# Patient Record
Sex: FEMALE | Race: BLACK OR AFRICAN AMERICAN | NOT HISPANIC OR LATINO | URBAN - METROPOLITAN AREA
[De-identification: names, ages, dates, MRNs, and addresses within clinical notes are randomized per-mention and may not be internally consistent; named-entity substitution may affect disease eponyms.]

---

## 2018-08-13 ENCOUNTER — INPATIENT (INPATIENT)
Facility: HOSPITAL | Age: 53
LOS: 2 days | Discharge: PSYCHIATRIC OR SUBSTANCE USE FACILITY | End: 2018-08-16
Attending: HOSPITALIST | Admitting: HOSPITALIST
Payer: COMMERCIAL

## 2018-08-13 VITALS
RESPIRATION RATE: 18 BRPM | SYSTOLIC BLOOD PRESSURE: 127 MMHG | OXYGEN SATURATION: 98 % | HEART RATE: 80 BPM | TEMPERATURE: 98 F | DIASTOLIC BLOOD PRESSURE: 83 MMHG

## 2018-08-13 DIAGNOSIS — F13.230 SEDATIVE, HYPNOTIC OR ANXIOLYTIC DEPENDENCE WITH WITHDRAWAL, UNCOMPLICATED: ICD-10-CM

## 2018-08-13 DIAGNOSIS — R41.82 ALTERED MENTAL STATUS, UNSPECIFIED: ICD-10-CM

## 2018-08-13 DIAGNOSIS — F32.3 MAJOR DEPRESSIVE DISORDER, SINGLE EPISODE, SEVERE WITH PSYCHOTIC FEATURES: ICD-10-CM

## 2018-08-13 DIAGNOSIS — Z29.9 ENCOUNTER FOR PROPHYLACTIC MEASURES, UNSPECIFIED: ICD-10-CM

## 2018-08-13 DIAGNOSIS — F32.9 MAJOR DEPRESSIVE DISORDER, SINGLE EPISODE, UNSPECIFIED: ICD-10-CM

## 2018-08-13 DIAGNOSIS — F29 UNSPECIFIED PSYCHOSIS NOT DUE TO A SUBSTANCE OR KNOWN PHYSIOLOGICAL CONDITION: ICD-10-CM

## 2018-08-13 DIAGNOSIS — E87.6 HYPOKALEMIA: ICD-10-CM

## 2018-08-13 DIAGNOSIS — Z90.710 ACQUIRED ABSENCE OF BOTH CERVIX AND UTERUS: Chronic | ICD-10-CM

## 2018-08-13 LAB
ALBUMIN SERPL ELPH-MCNC: 4.6 G/DL — SIGNIFICANT CHANGE UP (ref 3.3–5)
ALP SERPL-CCNC: 50 U/L — SIGNIFICANT CHANGE UP (ref 40–120)
ALT FLD-CCNC: 20 U/L — SIGNIFICANT CHANGE UP (ref 4–33)
AMPHET UR-MCNC: NEGATIVE — SIGNIFICANT CHANGE UP
APAP SERPL-MCNC: < 15 UG/ML — LOW (ref 15–25)
APPEARANCE UR: CLEAR — SIGNIFICANT CHANGE UP
AST SERPL-CCNC: 28 U/L — SIGNIFICANT CHANGE UP (ref 4–32)
BARBITURATES UR SCN-MCNC: NEGATIVE — SIGNIFICANT CHANGE UP
BASE EXCESS BLDV CALC-SCNC: 3.1 MMOL/L — SIGNIFICANT CHANGE UP
BASOPHILS # BLD AUTO: 0.01 K/UL — SIGNIFICANT CHANGE UP (ref 0–0.2)
BASOPHILS NFR BLD AUTO: 0.2 % — SIGNIFICANT CHANGE UP (ref 0–2)
BENZODIAZ UR-MCNC: NEGATIVE — SIGNIFICANT CHANGE UP
BILIRUB SERPL-MCNC: 0.8 MG/DL — SIGNIFICANT CHANGE UP (ref 0.2–1.2)
BILIRUB UR-MCNC: NEGATIVE — SIGNIFICANT CHANGE UP
BLOOD GAS VENOUS - CREATININE: 0.7 MG/DL — SIGNIFICANT CHANGE UP (ref 0.5–1.3)
BLOOD UR QL VISUAL: NEGATIVE — SIGNIFICANT CHANGE UP
BUN SERPL-MCNC: 7 MG/DL — SIGNIFICANT CHANGE UP (ref 7–23)
CALCIUM SERPL-MCNC: 9.4 MG/DL — SIGNIFICANT CHANGE UP (ref 8.4–10.5)
CANNABINOIDS UR-MCNC: NEGATIVE — SIGNIFICANT CHANGE UP
CHLORIDE BLDV-SCNC: 102 MMOL/L — SIGNIFICANT CHANGE UP (ref 96–108)
CHLORIDE SERPL-SCNC: 96 MMOL/L — LOW (ref 98–107)
CO2 SERPL-SCNC: 18 MMOL/L — LOW (ref 22–31)
COCAINE METAB.OTHER UR-MCNC: NEGATIVE — SIGNIFICANT CHANGE UP
COLOR SPEC: YELLOW — SIGNIFICANT CHANGE UP
CREAT SERPL-MCNC: 0.72 MG/DL — SIGNIFICANT CHANGE UP (ref 0.5–1.3)
EOSINOPHIL # BLD AUTO: 0 K/UL — SIGNIFICANT CHANGE UP (ref 0–0.5)
EOSINOPHIL NFR BLD AUTO: 0 % — SIGNIFICANT CHANGE UP (ref 0–6)
ETHANOL BLD-MCNC: < 10 MG/DL — SIGNIFICANT CHANGE UP
GAS PNL BLDV: 134 MMOL/L — LOW (ref 136–146)
GLUCOSE BLDV-MCNC: 88 — SIGNIFICANT CHANGE UP (ref 70–99)
GLUCOSE SERPL-MCNC: 89 MG/DL — SIGNIFICANT CHANGE UP (ref 70–99)
GLUCOSE UR-MCNC: NEGATIVE — SIGNIFICANT CHANGE UP
HCO3 BLDV-SCNC: 25 MMOL/L — SIGNIFICANT CHANGE UP (ref 20–27)
HCT VFR BLD CALC: 40 % — SIGNIFICANT CHANGE UP (ref 34.5–45)
HCT VFR BLDV CALC: 41.8 % — SIGNIFICANT CHANGE UP (ref 34.5–45)
HGB BLD-MCNC: 13.7 G/DL — SIGNIFICANT CHANGE UP (ref 11.5–15.5)
HGB BLDV-MCNC: 13.6 G/DL — SIGNIFICANT CHANGE UP (ref 11.5–15.5)
IMM GRANULOCYTES # BLD AUTO: 0.03 # — SIGNIFICANT CHANGE UP
IMM GRANULOCYTES NFR BLD AUTO: 0.5 % — SIGNIFICANT CHANGE UP (ref 0–1.5)
KETONES UR-MCNC: SIGNIFICANT CHANGE UP
LACTATE BLDV-MCNC: 1.2 MMOL/L — SIGNIFICANT CHANGE UP (ref 0.5–2)
LEUKOCYTE ESTERASE UR-ACNC: HIGH
LYMPHOCYTES # BLD AUTO: 1.87 K/UL — SIGNIFICANT CHANGE UP (ref 1–3.3)
LYMPHOCYTES # BLD AUTO: 31 % — SIGNIFICANT CHANGE UP (ref 13–44)
MCHC RBC-ENTMCNC: 30.9 PG — SIGNIFICANT CHANGE UP (ref 27–34)
MCHC RBC-ENTMCNC: 34.3 % — SIGNIFICANT CHANGE UP (ref 32–36)
MCV RBC AUTO: 90.1 FL — SIGNIFICANT CHANGE UP (ref 80–100)
METHADONE UR-MCNC: NEGATIVE — SIGNIFICANT CHANGE UP
MONOCYTES # BLD AUTO: 0.35 K/UL — SIGNIFICANT CHANGE UP (ref 0–0.9)
MONOCYTES NFR BLD AUTO: 5.8 % — SIGNIFICANT CHANGE UP (ref 2–14)
MUCOUS THREADS # UR AUTO: SIGNIFICANT CHANGE UP
NEUTROPHILS # BLD AUTO: 3.77 K/UL — SIGNIFICANT CHANGE UP (ref 1.8–7.4)
NEUTROPHILS NFR BLD AUTO: 62.5 % — SIGNIFICANT CHANGE UP (ref 43–77)
NITRITE UR-MCNC: NEGATIVE — SIGNIFICANT CHANGE UP
NON-SQ EPI CELLS # UR AUTO: <1 — SIGNIFICANT CHANGE UP
NRBC # FLD: 0 — SIGNIFICANT CHANGE UP
OPIATES UR-MCNC: NEGATIVE — SIGNIFICANT CHANGE UP
OXYCODONE UR-MCNC: NEGATIVE — SIGNIFICANT CHANGE UP
PCO2 BLDV: 47 MMHG — SIGNIFICANT CHANGE UP (ref 41–51)
PCP UR-MCNC: NEGATIVE — SIGNIFICANT CHANGE UP
PH BLDV: 7.39 PH — SIGNIFICANT CHANGE UP (ref 7.32–7.43)
PH UR: 7 — SIGNIFICANT CHANGE UP (ref 4.6–8)
PLATELET # BLD AUTO: 214 K/UL — SIGNIFICANT CHANGE UP (ref 150–400)
PMV BLD: 10.1 FL — SIGNIFICANT CHANGE UP (ref 7–13)
PO2 BLDV: 19 MMHG — LOW (ref 35–40)
POTASSIUM BLDV-SCNC: 3.8 MMOL/L — SIGNIFICANT CHANGE UP (ref 3.4–4.5)
POTASSIUM SERPL-MCNC: 3.3 MMOL/L — LOW (ref 3.5–5.3)
POTASSIUM SERPL-SCNC: 3.3 MMOL/L — LOW (ref 3.5–5.3)
PROT SERPL-MCNC: 8 G/DL — SIGNIFICANT CHANGE UP (ref 6–8.3)
PROT UR-MCNC: 20 MG/DL — SIGNIFICANT CHANGE UP
RBC # BLD: 4.44 M/UL — SIGNIFICANT CHANGE UP (ref 3.8–5.2)
RBC # FLD: 11.7 % — SIGNIFICANT CHANGE UP (ref 10.3–14.5)
RBC CASTS # UR COMP ASSIST: SIGNIFICANT CHANGE UP (ref 0–?)
SALICYLATES SERPL-MCNC: < 5 MG/DL — LOW (ref 15–30)
SAO2 % BLDV: 22.3 % — LOW (ref 60–85)
SODIUM SERPL-SCNC: 135 MMOL/L — SIGNIFICANT CHANGE UP (ref 135–145)
SP GR SPEC: 1.01 — SIGNIFICANT CHANGE UP (ref 1–1.04)
SQUAMOUS # UR AUTO: SIGNIFICANT CHANGE UP
TSH SERPL-MCNC: 0.34 UIU/ML — SIGNIFICANT CHANGE UP (ref 0.27–4.2)
UROBILINOGEN FLD QL: NORMAL MG/DL — SIGNIFICANT CHANGE UP
WBC # BLD: 6.03 K/UL — SIGNIFICANT CHANGE UP (ref 3.8–10.5)
WBC # FLD AUTO: 6.03 K/UL — SIGNIFICANT CHANGE UP (ref 3.8–10.5)
WBC UR QL: HIGH (ref 0–?)

## 2018-08-13 PROCEDURE — 70450 CT HEAD/BRAIN W/O DYE: CPT | Mod: 26

## 2018-08-13 PROCEDURE — 90792 PSYCH DIAG EVAL W/MED SRVCS: CPT | Mod: GC

## 2018-08-13 PROCEDURE — 99223 1ST HOSP IP/OBS HIGH 75: CPT | Mod: GC

## 2018-08-13 RX ORDER — POTASSIUM CHLORIDE 20 MEQ
40 PACKET (EA) ORAL ONCE
Qty: 0 | Refills: 0 | Status: COMPLETED | OUTPATIENT
Start: 2018-08-13 | End: 2018-08-13

## 2018-08-13 RX ORDER — ALPRAZOLAM 0.25 MG
0.25 TABLET ORAL THREE TIMES A DAY
Qty: 0 | Refills: 0 | Status: DISCONTINUED | OUTPATIENT
Start: 2018-08-13 | End: 2018-08-14

## 2018-08-13 RX ADMIN — Medication 2 MILLIGRAM(S): at 15:52

## 2018-08-13 RX ADMIN — Medication 0.25 MILLIGRAM(S): at 22:44

## 2018-08-13 RX ADMIN — Medication 40 MILLIEQUIVALENT(S): at 16:16
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
## 2018-08-13 NOTE — ED BEHAVIORAL HEALTH ASSESSMENT NOTE - SUBSTANCE ISSUES AND PLAN (INCLUDE STANDING AND PRN MEDICATION)
initiate ativan taper 2 mg q6h, with 2 mg prn for CIWA scores >9 initiate ativan taper 2 mg q6h, with 2 mg prn for CIWA scores >9, can decrease dose by 0.5 mg every 24 hours if symptoms are controlled

## 2018-08-13 NOTE — ED PROVIDER NOTE - MEDICAL DECISION MAKING DETAILS
54 yo female with psych history and bizarre behavior with med non compliance. will obtain labs and psych consult.

## 2018-08-13 NOTE — H&P ADULT - NSHPREVIEWOFSYSTEMS_GEN_ALL_CORE
General: Denies dizziness, fatigue  Eyes: Denies blurry vision  ENMT: Denies rhinorrhea  Respiratory: Denies cough, SOB  Cardiovascular: Denies palpitations, CP  Gastrointestinal: Denies abd pain, N/V/D/C, hematochezia, melena. +decreased appetite.  Musculoskeletal: Denies edema, joint pain  Allergic/Immunologic: +facial hyperpigmentation  Psych: denies suicidal/homicidal ideations
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
## 2018-08-13 NOTE — H&P ADULT - ASSESSMENT
52 yo female with ?PMH of depression and anxiety with past psych admissions in [place], brought in by sister for strange behavior, admitted for possible BZD withdrawal vs acute psychosis.

## 2018-08-13 NOTE — ED BEHAVIORAL HEALTH ASSESSMENT NOTE - CONSEQUENCES
Pt reports a history of physical withdrawal symptoms (tremors), denies history of seizures/hospitalizations

## 2018-08-13 NOTE — ED PROVIDER NOTE - PROGRESS NOTE DETAILS
Jocelyn: Pt seen and examined by psych attending. pt now having auditory and olfactory hallucinations. pt also now tremulous and likely in benzo withdrawal will give BENZOS, OBTAIN CT HEAD AND MEDICALLY ADMIT. Jocelyn: will send [facility], hospitalist to follow. will admit patient.

## 2018-08-13 NOTE — ED ADULT NURSE NOTE - OBJECTIVE STATEMENT
Patient received in  c/o worsening depression, Per EMS, pt has been increasingly depressed and has been requesting her sister to take her to the hospital sister saids she is trying to ween pt off her medications. today pt jumped of the (shallow) window and was walking barefooted towards [facility] where she was picked up by EMS. In  pt appears depressed, verbalizing feeling of helplessness, insomnia, anhedonia and anorexia. Pt denies SI&HI, for AH pt stated "I sometimes hear music" pt denies CAH. Pt denies ETOH and substance use. safety and comfort measures maintained. will continue to monitor

## 2018-08-13 NOTE — ED PROVIDER NOTE - CARE PLAN
Principal Discharge DX:	Altered mental status, unspecified altered mental status type  Secondary Diagnosis:	Benzodiazepine withdrawal without complication  Secondary Diagnosis:	Hypokalemia

## 2018-08-13 NOTE — ED BEHAVIORAL HEALTH ASSESSMENT NOTE - PRIMARY DX
Psychosis, unspecified psychosis type Major depressive disorder with psychotic features Benzodiazepine withdrawal without complication
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
## 2018-08-13 NOTE — ED BEHAVIORAL HEALTH ASSESSMENT NOTE - RISK ASSESSMENT
Pt is at elevated imminent risk of self-harm given her active psychosis, depressive mood episode and substance use withdrawal. She is also suffering from psychosocial stressors with recent unemployment and current isolation. She reports a recent suicide attempt and is not adherent to any current antidepressants/antipsychotics. She has supportive family and no access to means and denies current SI/HI, but is actively disorganized and has recently lost 20 lbs, concerning for her ability to care for herself. Pt is at elevated imminent risk of self-harm given her active psychosis, depressive mood episode and substance use withdrawal. She is also suffering from psychosocial stressors with recent unemployment and current isolation. She reports a recent suicide attempt and is not adherent to any current antidepressants/antipsychotics. She has supportive family and no access to means and denies current SI/HI, but is actively disorganized and has recently lost 20 lbs, concerning for her ability to care for herself. She will be medically admitted at this time and will require re-evaluation by psychiatry for need to be admitted psychiatrically at a later time.
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
## 2018-08-13 NOTE — ED ADULT NURSE NOTE - NSIMPLEMENTINTERV_GEN_ALL_ED
Implemented All Universal Safety Interventions:  [place] to call system. Call bell, personal items and telephone within reach. Instruct patient to call for assistance. Room bathroom lighting operational. Non-slip footwear when patient is off stretcher. Physically safe environment: no spills, clutter or unnecessary equipment. Stretcher in lowest position, wheels locked, appropriate side rails in place.

## 2018-08-13 NOTE — H&P ADULT - NSHPLABSRESULTS_GEN_ALL_CORE
13.7   6.03  )-----------( 214      ( 13 Aug 2018 12:45 )             40.0       135  |  96<L>  |  7   ----------------------------<  89  3.3<L>   |  18<L>  |  0.72    Ca    9.4      13 Aug 2018 12:41    TPro  8.0  /  Alb  4.6  /  TBili  0.8  /  DBili  x   /  AST  28  /  ALT  20  /  AlkPhos  50  08-13    Urinalysis Basic - ( 13 Aug 2018 12:51 )  Color: YELLOW / Appearance: CLEAR / S.014 / pH: 7.0  Gluc: NEGATIVE / Ketone: LARGE  / Bili: NEGATIVE / Urobili: NORMAL mg/dL   Blood: NEGATIVE / Protein: 20 mg/dL / Nitrite: NEGATIVE   Leuk Esterase: MODERATE / RBC: 0-2 / WBC 5-10   Sq Epi: OCC / Non Sq Epi: x / Bacteria: x    < from: CT Head No Cont (18 @ 15:50) >    Unremarkable noncontrast head CT.    < end of copied text >    Toxicology Screen, Drugs of Abuse, Urine (18 @ 12:51)    Phencyclidine Level, Urine: NEGATIVE    Amphetamine, Urine: NEGATIVE    Barbiturates Screen, Urine: NEGATIVE    Benzodiazepine, Urine: NEGATIVE    Cannabinoids, Urine: NEGATIVE    Cocaine Metabolite, Urine: NEGATIVE    Methadone, Urine: NEGATIVE    Opiate, Urine: NEGATIVE    Oxycodone, Urine: NEGATIVE:

## 2018-08-13 NOTE — H&P ADULT - NSHPSOCIALHISTORY_GEN_ALL_CORE
Lives alone in [place]. Has daughter Christy who she is in contact with. Currently staying with sister in [place]. Denies smoking, drinking, recreational drugs.

## 2018-08-13 NOTE — H&P ADULT - NSHPPHYSICALEXAM_GEN_ALL_CORE
PHYSICAL EXAM:  GENERAL: No acute distress  HEAD:  Atraumatic, Normocephalic  EYES: EOMI, PERRLA, conjunctiva and sclera clear  NECK: Supple, no lymphadenopathy  CHEST/LUNG: CTAB; No wheezes, rales, or rhonchi  HEART: Regular rate and rhythm; No murmurs, rubs, or gallops  ABDOMEN: Soft, non-tender, non-distended; normal bowel sounds  EXTREMITIES:  2+ peripheral pulses b/l, No clubbing, cyanosis, or edema  NEUROLOGY: A&O x 4. Tremulous when arms outstretched.  SKIN: Areas of scar hyperpigmentation on face  PSYCH: Normal mood and affect, some pressured speech, tangential thinking

## 2018-08-13 NOTE — ED BEHAVIORAL HEALTH ASSESSMENT NOTE - OTHER PAST PSYCHIATRIC HISTORY (INCLUDE DETAILS REGARDING ONSET, COURSE OF ILLNESS, INPATIENT/OUTPATIENT TREATMENT)
One hospitalization after gabapentin overdose (may have been medical), she reports this is her only suicide attempt. Never pursued outpatient psychiatric treatment, she receives her medications through her PCP.

## 2018-08-13 NOTE — ED BEHAVIORAL HEALTH ASSESSMENT NOTE - OTHER
sister called 932 bilateral low amplitude resting tremor, pt frequently shifting restlessly in bed, at times lying across the bed horizontally without support bilateral low amplitude resting tremor, tongue fasciculations present, pt frequently shifting restlessly in bed, at times lying across the bed horizontally without support

## 2018-08-13 NOTE — ED BEHAVIORAL HEALTH ASSESSMENT NOTE - DESCRIPTION
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 Vital Signs Last 24 Hrs  T(C): 36.8 (13 Aug 2018 11:42), Max: 36.8 (13 Aug 2018 11:42)  T(F): 98.3 (13 Aug 2018 11:42), Max: 98.3 (13 Aug 2018 11:42)  HR: 80 (13 Aug 2018 11:42) (80 - 80)  BP: 127/83 (13 Aug 2018 11:42) (127/83 - 127/83)  RR: 18 (13 Aug 2018 11:42) (18 - 18)  SpO2: 98% (13 Aug 2018 11:42) (98% - 98%)                        13.7   6.03  )-----------( 214      ( 13 Aug 2018 12:45 )             40.0     Utox negative ICU Vital Signs Last 24 Hrs  T(C): 36.8 (13 Aug 2018 11:42), Max: 36.8 (13 Aug 2018 11:42)  T(F): 98.3 (13 Aug 2018 11:42), Max: 98.3 (13 Aug 2018 11:42)  HR: 80 (13 Aug 2018 11:42) (80 - 80)  BP: 127/83 (13 Aug 2018 11:42) (127/83 - 127/83)  RR: 18 (13 Aug 2018 11:42) (18 - 18)  SpO2: 98% (13 Aug 2018 11:42) (98% - 98%)                        13.7   6.03  )-----------( 214      ( 13 Aug 2018 12:45 )             40.0   08-13    135  |  96<L>  |  7   ----------------------------<  89  3.3<L>   |  18<L>  |  0.72    Ca    9.4      13 Aug 2018 12:41    TPro  8.0  /  Alb  4.6  /  TBili  0.8  /  DBili  x   /  AST  28  /  ALT  20  /  AlkPhos  50  08-13    Utox negative

## 2018-08-13 NOTE — H&P ADULT - ATTENDING COMMENTS
this unfortunate woman with an unclear psych history presents after abruptly stopping her xanax 3 days ago.  she had mild tremors and tachycardia which may be manifestations of benzo withdrawal.  we will admit her to medicine and treat with a slow benzo taper.

## 2018-08-13 NOTE — H&P ADULT - HISTORY OF PRESENT ILLNESS
52 yo female with unclear psychiatric history BIBEMS for bizarre behavior. Pt lives in [place] and has been visiting her sister. EMS reports that the sister says she hasn't been taking her psych meds and since then been acting strange. Today pt tried to climb out the window prompting her sister to call 911. Pt says that she only takes xanax. Pt denies suicidal/homicidal ideations. Denies audio and visual hallucinations. Pt is a poor historian. 52 yo female with unclear psychiatric history BIBEMS for bizarre behavior. Pt lives in [place] and has been visiting her sister who lives in [place]. As per chart review, EMS reports that the sister says she hasn't been taking her psych meds and since then has been acting strange. Today pt tried to climb out the window prompting her sister to call 911. According to patient, she climbed out of the window to get some fresh air and because her sister wouldn't take her to the doctor. Pt reports taking xanax 1mg TID and gabapentin. She stopped taking gabapentin 2 weeks ago because it was "eating her insides" and "destroying her body". She stopped Xanax on Friday because she forgot the medications at home when she came to [place] to visit her sister. Confirmed this with her daughter Christy who reports the pill bottle is at home. Pt denies suicidal/homicidal ideations. Denies audio and visual hallucinations. Pt is a poor historian. Reports being depressed since losing her job several months ago. Currently having some tremors in arms that she does not usually have. Denies any seizures at home. Reports having decreased appetite and 30 lb weight loss over last several months.

## 2018-08-13 NOTE — ED BEHAVIORAL HEALTH ASSESSMENT NOTE - DETAILS
low back pain see HPI, recent gabapentin OD in [place] daughter informed of admission to hospital information added to NELLIE garcia

## 2018-08-14 LAB
BUN SERPL-MCNC: 8 MG/DL — SIGNIFICANT CHANGE UP (ref 7–23)
CALCIUM SERPL-MCNC: 9 MG/DL — SIGNIFICANT CHANGE UP (ref 8.4–10.5)
CHLORIDE SERPL-SCNC: 99 MMOL/L — SIGNIFICANT CHANGE UP (ref 98–107)
CO2 SERPL-SCNC: 24 MMOL/L — SIGNIFICANT CHANGE UP (ref 22–31)
CREAT SERPL-MCNC: 0.76 MG/DL — SIGNIFICANT CHANGE UP (ref 0.5–1.3)
GLUCOSE SERPL-MCNC: 88 MG/DL — SIGNIFICANT CHANGE UP (ref 70–99)
HCT VFR BLD CALC: 34.7 % — SIGNIFICANT CHANGE UP (ref 34.5–45)
HGB BLD-MCNC: 11.7 G/DL — SIGNIFICANT CHANGE UP (ref 11.5–15.5)
MAGNESIUM SERPL-MCNC: 2.1 MG/DL — SIGNIFICANT CHANGE UP (ref 1.6–2.6)
MCHC RBC-ENTMCNC: 29.5 PG — SIGNIFICANT CHANGE UP (ref 27–34)
MCHC RBC-ENTMCNC: 33.7 % — SIGNIFICANT CHANGE UP (ref 32–36)
MCV RBC AUTO: 87.6 FL — SIGNIFICANT CHANGE UP (ref 80–100)
NRBC # FLD: 0 — SIGNIFICANT CHANGE UP
PHOSPHATE SERPL-MCNC: 4 MG/DL — SIGNIFICANT CHANGE UP (ref 2.5–4.5)
PLATELET # BLD AUTO: 178 K/UL — SIGNIFICANT CHANGE UP (ref 150–400)
PMV BLD: 10 FL — SIGNIFICANT CHANGE UP (ref 7–13)
POTASSIUM SERPL-MCNC: 4 MMOL/L — SIGNIFICANT CHANGE UP (ref 3.5–5.3)
POTASSIUM SERPL-SCNC: 4 MMOL/L — SIGNIFICANT CHANGE UP (ref 3.5–5.3)
RBC # BLD: 3.96 M/UL — SIGNIFICANT CHANGE UP (ref 3.8–5.2)
RBC # FLD: 11.9 % — SIGNIFICANT CHANGE UP (ref 10.3–14.5)
SODIUM SERPL-SCNC: 134 MMOL/L — LOW (ref 135–145)
WBC # BLD: 5.9 K/UL — SIGNIFICANT CHANGE UP (ref 3.8–10.5)
WBC # FLD AUTO: 5.9 K/UL — SIGNIFICANT CHANGE UP (ref 3.8–10.5)

## 2018-08-14 PROCEDURE — 99233 SBSQ HOSP IP/OBS HIGH 50: CPT | Mod: GC

## 2018-08-14 PROCEDURE — 99223 1ST HOSP IP/OBS HIGH 75: CPT

## 2018-08-14 RX ORDER — ACETAMINOPHEN 500 MG
650 TABLET ORAL EVERY 6 HOURS
Qty: 0 | Refills: 0 | Status: DISCONTINUED | OUTPATIENT
Start: 2018-08-14 | End: 2018-08-16

## 2018-08-14 RX ORDER — OLANZAPINE 15 MG/1
1 TABLET, FILM COATED ORAL
Qty: 0 | Refills: 0 | COMMUNITY
Start: 2018-08-14

## 2018-08-14 RX ORDER — ACETAMINOPHEN 500 MG
650 TABLET ORAL ONCE
Qty: 0 | Refills: 0 | Status: COMPLETED | OUTPATIENT
Start: 2018-08-14 | End: 2018-08-14

## 2018-08-14 RX ORDER — ALPRAZOLAM 0.25 MG
1 TABLET ORAL
Qty: 0 | Refills: 0 | COMMUNITY

## 2018-08-14 RX ORDER — OLANZAPINE 15 MG/1
2.5 TABLET, FILM COATED ORAL AT BEDTIME
Qty: 0 | Refills: 0 | Status: DISCONTINUED | OUTPATIENT
Start: 2018-08-14 | End: 2018-08-16

## 2018-08-14 RX ORDER — ESCITALOPRAM OXALATE 10 MG/1
1 TABLET, FILM COATED ORAL
Qty: 0 | Refills: 0 | COMMUNITY
Start: 2018-08-14

## 2018-08-14 RX ORDER — HYDROXYZINE HCL 10 MG
50 TABLET ORAL EVERY 6 HOURS
Qty: 0 | Refills: 0 | Status: DISCONTINUED | OUTPATIENT
Start: 2018-08-14 | End: 2018-08-16

## 2018-08-14 RX ORDER — ESCITALOPRAM OXALATE 10 MG/1
5 TABLET, FILM COATED ORAL DAILY
Qty: 0 | Refills: 0 | Status: DISCONTINUED | OUTPATIENT
Start: 2018-08-14 | End: 2018-08-16

## 2018-08-14 RX ORDER — OLANZAPINE 15 MG/1
5 TABLET, FILM COATED ORAL EVERY 6 HOURS
Qty: 0 | Refills: 0 | Status: DISCONTINUED | OUTPATIENT
Start: 2018-08-14 | End: 2018-08-16

## 2018-08-14 RX ADMIN — Medication 50 MILLIGRAM(S): at 19:25

## 2018-08-14 RX ADMIN — Medication 650 MILLIGRAM(S): at 08:21

## 2018-08-14 RX ADMIN — Medication 2 MILLIGRAM(S): at 22:24

## 2018-08-14 RX ADMIN — Medication 650 MILLIGRAM(S): at 17:20

## 2018-08-14 RX ADMIN — Medication 650 MILLIGRAM(S): at 16:39

## 2018-08-14 RX ADMIN — OLANZAPINE 2.5 MILLIGRAM(S): 15 TABLET, FILM COATED ORAL at 22:24

## 2018-08-14 RX ADMIN — ESCITALOPRAM OXALATE 5 MILLIGRAM(S): 10 TABLET, FILM COATED ORAL at 19:26

## 2018-08-14 RX ADMIN — Medication 0.25 MILLIGRAM(S): at 05:47

## 2018-08-14 RX ADMIN — Medication 650 MILLIGRAM(S): at 09:34

## 2018-08-14 NOTE — DISCHARGE NOTE ADULT - HOSPITAL COURSE
HPI: 52 yo female with ?PMH of depression and anxiety presented with bizarre behavior as per sister, was admitted for possible BZD withdrawal vs. acute psychosis. Pt lives in [place] and has been visiting her sister who lives in [place]. As per chart review, EMS reports that the sister says she hasn't been taking her psych meds and since then has been acting strange. Patient tried to climb out the window prompting her sister to call 911. According to patient, she climbed out of the window to get some fresh air and because her sister wouldn't take her to the doctor. Pt is a poor historian. She reports taking xanax 1mg TID and gabapentin. She stopped taking gabapentin 2 weeks ago because it was "eating her insides" and "destroying her body". She stopped Xanax on Friday because she forgot the medications at home when she came to [place] to visit her sister. Confirmed this with her daughter Christy who reports the pill bottle is at home. Pt denies suicidal/homicidal ideations. Denies audio and visual hallucinations. Reports being depressed since losing her job several months ago. Currently having some tremors in arms that she does not usually have. Denies any seizures at home. Reports having decreased appetite and 30 lb weight loss over last several months.    Course: Patient was admitted to medicine for possible BZD withdrawal with tremors in the arms. She was started on xanax 0.25mg TID (was on 1mg TID at home). Switched to ativan taper with CIWA protocol as per psych recs. Also started on lexapro 5mg daily, zyprexa 2.5mg qHS, as well as PRN atarax for anxiety and PRN zyprexa for agitation. Vitals remained stable. Initially hypokalemic to 3.3 likely from decreased PO intake, was corrected and stable at 4.0 Patient has become more psychotic with paranoia and anxiety. Was placed on constant observation because of threats to harm self or others. BZD withdrawal symptoms have improved, patient ready for discharge to psychiatric facility. This is a 53 year old female with no known PMH and unclear psychiatric history (depression and anxiety as per patient) who presented with bizarre behavior as per her sister and was admitted to medicine for possible benzodiazepine withdrawal, as patient abruptly stopped taking xanax on Friday. On admission, patient was noted to have tremors in her arms. She was started on xanax 0.25mg TID (was on 1mg TID at home). She became more psychotic with paranoia and anxiety, and was placed on constant observation because of threats to harm self and others She was switched to an ativan taper with CIWA protocol; she was also started on lexapro 5mg daily, zyprexa 2.5mg qHS, as well as PRN atarax for anxiety and PRN zyprexa for agitation as per psych recommendations. Vitals remained stable. She was initially hypokalemic to 3.3 likely from decreased PO intake, was supplemented with potassium and level corrected. Patient initially scoring 7 on CIWA, down to 4 yesterday. She has not needed any PRN ativan and is currently on standing dose of 1mg q8 PO. Her withdrawal symptoms have improved overall and patient ready for discharge to psychiatric facility. This is a 53 year old female with no known PMH and unclear psychiatric history (depression and anxiety as per patient) who presented with bizarre behavior as per her sister and was admitted to medicine for possible benzodiazepine withdrawal, as the patient abruptly stopped taking xanax on Friday. On admission, patient was noted to have tremors in her arms. She was started on xanax 0.25mg TID (was on 1mg TID at home). She became more psychotic with paranoia and anxiety, and was placed on constant observation because of threats to harm herself and others. She was switched to an ativan taper with CIWA protocol per psychiatry recommendations. She was also started on lexapro 5mg daily, zyprexa 2.5mg qHS, as well as PRN atarax for anxiety and PRN zyprexa for agitation. Vitals remained stable. She was initially hypokalemic to 3.3 likely from decreased PO intake, was supplemented with potassium and level corrected. Patient initially scoring 7 on CIWA which decreased to 4. She has not needed any PRN ativan and is currently on standing dose of 1mg q8 PO. Her withdrawal symptoms have improved overall and patient ready for discharge to psychiatric facility. This is a 53 year old female with no known PMH and unclear psychiatric history (depression and anxiety as per patient) who presented with bizarre behavior as per her sister and was admitted to medicine for possible benzodiazepine withdrawal, as the patient abruptly stopped taking xanax on Friday. On admission, patient was noted to have tremors in her arms. She was started on xanax 0.25mg TID (was on 1mg TID at home). She became more psychotic with paranoia and anxiety, and was placed on constant observation because of threats to harm herself and others. She was switched to an ativan taper with CIWA protocol per psychiatry recommendations. She was also started on lexapro 5mg daily, zyprexa 2.5mg qHS, as well as PRN atarax for anxiety and PRN zyprexa for agitation. Vitals remained stable. She was initially hypokalemic to 3.3 likely from decreased PO intake, was supplemented with potassium and level corrected. Patient initially scoring 7 on CIWA which decreased to 4. She has not needed any PRN ativan and is currently on standing dose of 1mg q8 PO. Her withdrawal symptoms have improved overall and patient ready for discharge to psychiatric facility.     medicine attending addendum- this pleasant woman with an undefined psych history presented from another state after abruptly stopping her xanax 3 days PTA. she had tremors and was psychotic. we admitted her to medicine fearing a benzo withdrawal- she responded well to an ativan protocol, required 1:1 observation and is now stable for transfer to the psychiatric team.

## 2018-08-14 NOTE — DISCHARGE NOTE ADULT - CONDITIONS AT DISCHARGE
Withdrawal symptoms improved, medically cleared for discharge to psych facility. Tremors in arms much reduced from admission. Currently on Ativan 1mg q8 PO, zyprexa, and lexapro.

## 2018-08-14 NOTE — PROGRESS NOTE ADULT - ASSESSMENT
54 yo female with ?PMH of depression and anxiety with past psych admissions in [place], brought in by sister for strange behavior, admitted for possible BZD withdrawal vs acute psychosis.

## 2018-08-14 NOTE — PROGRESS NOTE ADULT - PROBLEM SELECTOR PLAN 2
Psych following. Will clear medically first for BZD withdrawal and then can transfer to Psych. Psych following. Will clear medically first for BZD withdrawal and then can transfer to [facility].

## 2018-08-14 NOTE — DISCHARGE NOTE ADULT - PATIENT PORTAL LINK FT
You can access the [facility], offered by [facility], by registering with the following website: [email or web address]

## 2018-08-14 NOTE — PROGRESS NOTE ADULT - PROBLEM SELECTOR PLAN 1
Patient off xanax since Friday, was on 1mg TID. C/w 0.25mg TID. Assess for any BZD withdrawal. Vitals stable.

## 2018-08-14 NOTE — DISCHARGE NOTE ADULT - MEDICATION SUMMARY - MEDICATIONS TO STOP TAKING
I will STOP taking the medications listed below when I get home from the hospital:    Xanax 1 mg oral tablet  -- 1 tab(s) by mouth 3 times a day

## 2018-08-14 NOTE — DISCHARGE NOTE ADULT - CARE PLAN
Principal Discharge DX:	Benzodiazepine withdrawal without complication  Goal:	No more withdrawal symptoms  Assessment and plan of treatment:	You had symptoms (tremors) which could be due to withdrawal of medication (xanax) you were taking at home and stopped abruptly. We started you on lower doses of similar medication here and are tapering. We monitored you to ensure resolution of withdrawal symptoms. You are medically cleared and can be transferred to a psych facility for further management.  Secondary Diagnosis:	Psychosis, unspecified psychosis type  Goal:	Manage agitation, ensure no harm to self or others  Assessment and plan of treatment:	You had abnormal behavior that could be due to underlying psychiatric disorder that is not being treated. We started you on some medications to help with agitation. We have medically cleared you and you can be transferred to a psychiatric facility for further management.  Secondary Diagnosis:	Hypokalemia  Goal:	Correct electrolytes  Assessment and plan of treatment:	You had low level of potassium in the blood, likely due to decreased intake of food. We supplemented it and your level is now normal. Principal Discharge DX:	Benzodiazepine withdrawal without complication  Goal:	No more withdrawal symptoms  Assessment and plan of treatment:	You had symptoms (tremors) which could be due to withdrawal of medication (xanax) you were taking at home and stopped abruptly. We started you on lower doses of a similar medication here and are tapering it down. We monitored you to ensure resolution of withdrawal symptoms. You are medically cleared and can be transferred to a psychiatric facility for further management.  Secondary Diagnosis:	Psychosis, unspecified psychosis type  Goal:	Manage agitation, ensure no harm to self or others  Assessment and plan of treatment:	You had abnormal behavior that could be due to an underlying psychiatric disorder that is not being treated. We started you on some medications to help with agitation and depression. We have medically cleared you and you can be transferred to a psychiatric facility for further management.  Secondary Diagnosis:	Hypokalemia  Goal:	Correct electrolytes  Assessment and plan of treatment:	You had low level of potassium in the blood, likely due to decreased intake of food. We supplemented it and your level is now normal.

## 2018-08-14 NOTE — PROGRESS NOTE BEHAVIORAL HEALTH - NSBHFUPINTERVALHXFT_PSY_A_CORE
Patient is a 54yo F seen today for follow up of active paranoia and psychosis. No behavioral events or PRN medications required overnight. Patient is complaint with medications.   On encounter, patient is withdrawn, guarded and does not make eye contact. She is disorganized and a poor historian, frequently contradicting herself during conversation. She is tangential with loose associations and speaks in a low, soft voice that is difficult to hear. She states that she does not feel safe in the hospital, does not elaborate as to why, and repeatedly asks interviewer to not leave her alone. She endorses distressing, intrusive thoughts about wanting to burn things. She endorses SI, but will not elaborate on whether it is passive vs. active, and makes vague disorganized comments about overdosing on medications. She acknowledges that she attempted to kill herself a few weeks ago and has been hospitalized psychiatrically multiple times but would not share further details. She is unable to contract for safety. She states she has been taking Xanax for over 20 years and that she has not taken it since 08/10/18. She denies AVT hallucinations, however ruminates on the sensation of her body wasting away. She denies IOR and thought insertion. She denies CAH/VH.   Collateral gathered from patient’s daughter, Christy Snell ([phone]). Daughter states that she lives close to the patient and sees her almost every day. She notes that patient has been depressed since losing her job in April 2018. 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 Patient frequently expressed complaints at the family was trying to “push her out” of the house, and stated that they were upset that the elderly man had included her in his will. Patient stopped going to work in April 2018 due to inability to tolerate the conflict with family.  Daughter notes that patient had significant anxiety & depression and began unintentional weight loss before the loss of her job in April. Since May 2018, daughter estimates that patient has lost over 20lbs. Daughter does grocery shopping for patient, and finds that frequently the food is not touched. Since losing her job, patient has been isolating herself at home and not socializing with others. Daughter notes that patient has had increasing paranoia over the past 2 months, during which patient inexplicably does not want to be left alone and has distressing thoughts, such as wanting to burn down her apartment. Family attributes the paranoia to the fact that her outpatient psychiatrist switched her from a Klonopin to a Xanax prescription 2 months ago. Patient follows up with a psychiatrist Dr. Lonnie Sarabia but recently has been following up with Dr. Arrieta (not sure of spelling of the doctor’s name) ([phone]). Ms. Snell states that when the patient was admitted at [facility] the patient medication was switched to Xanax to Klonopin.  Daughter notes that patient’s outpatient psychiatrist was engaged in fraudulent behavior and is currently undergoing legal investigation. Daughter notes that 8 months ago, patient expressed feelings of “being a burden toward family” with passive SI, but daughter is not aware of any active SI or SA. Daughter confirms that patient was psychiatrically hospitalized in July at [facility]. She is unsure of the details of the hospitalization, but believes patient was hospitalized due to “bad thoughts” of wanting to burn her house down.   ISTOP #[id number] did not reveal any active Rx for controlled substances in [place]. Unable to search the [place] database. Patient is a 54yo F seen today for follow up of active paranoia and psychosis. No behavioral events or PRN medications required overnight. Patient is complaint with medications.     On encounter, patient is withdrawn, guarded and does not make eye contact. She is disorganized and a poor historian, frequently contradicting herself during conversation. She is tangential with loose associations and speaks in a low, soft voice that is difficult to hear. She states that she does not feel safe in the hospital, does not elaborate as to why, and repeatedly asks interviewer to not leave her alone. She endorses distressing, intrusive thoughts about wanting to burn things. She endorses SI, but will not elaborate on whether it is passive vs. active, and makes vague disorganized comments about overdosing on medications. She acknowledges that she attempted to kill herself a few weeks ago and has been hospitalized psychiatrically multiple times but would not share further details. She is unable to contract for safety. She states she has been taking Xanax for over 20 years and that she has not taken it since 08/10/18. She denies AVT hallucinations, however ruminates on the sensation of her body wasting away. She denies IOR and thought insertion. She denies CAH/VH.   Collateral gathered from patient’s daughter, Christy Snell ([phone]). Daughter states that she lives close to the patient and sees her almost every day. She notes that patient has been depressed since losing her job in April 2018. 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 Patient frequently expressed complaints at the family was trying to “push her out” of the house, and stated that they were upset that the elderly man had included her in his will. Patient stopped going to work in April 2018 due to inability to tolerate the conflict with family.  Daughter notes that patient had significant anxiety & depression and began unintentional weight loss before the loss of her job in April. Since May 2018, daughter estimates that patient has lost over 20lbs. Daughter does grocery shopping for patient, and finds that frequently the food is not touched. Since losing her job, patient has been isolating herself at home and not socializing with others. Daughter notes that patient has had increasing paranoia over the past 2 months, during which patient inexplicably does not want to be left alone and has distressing thoughts, such as wanting to burn down her apartment. Family attributes the paranoia to the fact that her outpatient psychiatrist switched her from a Klonopin to a Xanax prescription 2 months ago. Patient follows up with a psychiatrist Dr. oLnnie Sarabia but recently has been following up with Dr. Arrieta (not sure of spelling of the doctor’s name) ([phone]). Ms. Snell states that when the patient was admitted at [facility] the patient medication was switched to Xanax to Klonopin.  Daughter notes that patient’s outpatient psychiatrist was engaged in fraudulent behavior and is currently undergoing legal investigation. Daughter notes that 8 months ago, patient expressed feelings of “being a burden toward family” with passive SI, but daughter is not aware of any active SI or SA. Daughter confirms that patient was psychiatrically hospitalized in July at [facility]. She is unsure of the details of the hospitalization, but believes patient was hospitalized due to “bad thoughts” of wanting to burn her house down.   ISTOP #[id number] did not reveal any active Rx for controlled substances in [place]. Unable to search the [place] database.

## 2018-08-14 NOTE — PROGRESS NOTE ADULT - SUBJECTIVE AND OBJECTIVE BOX
INTERVAL HPI/OVERNIGHT EVENTS:    SUBJECTIVE: Patient seen and examined at bedside.     CONSTITUTIONAL: No weakness, fevers or chills  EYES/ENT: No visual changes;  No vertigo or throat pain   NECK: No pain or stiffness  RESPIRATORY: No cough, wheezing, hemoptysis; No shortness of breath  CARDIOVASCULAR: No chest pain or palpitations  GASTROINTESTINAL: No abdominal or epigastric pain. No nausea, vomiting, or hematemesis; No diarrhea or constipation. No melena or hematochezia.  GENITOURINARY: No dysuria, frequency or hematuria  NEUROLOGICAL: No numbness or weakness  SKIN: No itching, rashes    OBJECTIVE:    VITAL SIGNS:  ICU Vital Signs Last 24 Hrs  T(C): 36.9 (14 Aug 2018 05:50), Max: 36.9 (13 Aug 2018 22:23)  T(F): 98.5 (14 Aug 2018 05:50), Max: 98.5 (14 Aug 2018 05:50)  HR: 68 (14 Aug 2018 05:50) (68 - 86)  BP: 104/67 (14 Aug 2018 05:50) (104/67 - 127/83)  RR: 18 (14 Aug 2018 05:50) (18 - 18)  SpO2: 99% (14 Aug 2018 05:50) (98% - 100%)      PHYSICAL EXAM:  GENERAL: No acute distress  HEAD:  Atraumatic, Normocephalic  EYES: EOMI, PERRLA, conjunctiva and sclera clear  NECK: Supple, no lymphadenopathy  CHEST/LUNG: CTAB; No wheezes, rales, or rhonchi  HEART: Regular rate and rhythm; No murmurs, rubs, or gallops  ABDOMEN: Soft, non-tender, non-distended; normal bowel sounds  EXTREMITIES:  2+ peripheral pulses b/l, No clubbing, cyanosis, or edema  NEUROLOGY: A&O x 4. Tremulous when arms outstretched.  SKIN: Areas of scar hyperpigmentation on face  PSYCH: Normal mood and affect, some pressured speech, tangential thinking    MEDICATIONS:  MEDICATIONS  (STANDING):  ALPRAZolam 0.25 milliGRAM(s) Oral three times a day      ALLERGIES:  Allergies    No Known Allergies    Intolerances    Dilaudid (Pruritus; Rash)      LABS:                        13.7   6.03  )-----------( 214      ( 13 Aug 2018 12:45 )             40.0     08-13    135  |  96<L>  |  7   ----------------------------<  89  3.3<L>   |  18<L>  |  0.72    Ca    9.4      13 Aug 2018 12:41    TPro  8.0  /  Alb  4.6  /  TBili  0.8  /  DBili  x   /  AST  28  /  ALT  20  /  AlkPhos  50        Urinalysis Basic - ( 13 Aug 2018 12:51 )    Color: YELLOW / Appearance: CLEAR / S.014 / pH: 7.0  Gluc: NEGATIVE / Ketone: LARGE  / Bili: NEGATIVE / Urobili: NORMAL mg/dL   Blood: NEGATIVE / Protein: 20 mg/dL / Nitrite: NEGATIVE   Leuk Esterase: MODERATE / RBC: 0-2 / WBC 5-10   Sq Epi: OCC / Non Sq Epi: x / Bacteria: x        RADIOLOGY & ADDITIONAL TESTS: Reviewed. INTERVAL HPI/OVERNIGHT EVENTS: No acute events overnight. Afebrile, HR wnl.     SUBJECTIVE: Patient seen and examined at bedside. Complaining of HA this AM, with sharp and throbbing pain, 4/10 in intensity. More anxious than yesterday, worried about her kids being far away. Also reporting ear pain that feels like a pressure with occasional ringing in the ears.     CONSTITUTIONAL: No weakness, fevers or chills. + HA  EYES/ENT: No visual changes;  No vertigo or throat pain   NECK: No pain or stiffness  RESPIRATORY: No cough, wheezing, hemoptysis; No shortness of breath  CARDIOVASCULAR: No chest pain  GASTROINTESTINAL: No abdominal or epigastric pain. No nausea, vomiting, or hematemesis; No diarrhea or constipation. No melena or hematochezia.  GENITOURINARY: No dysuria, frequency or hematuria  NEUROLOGICAL: No numbness or weakness      OBJECTIVE:    VITAL SIGNS:  ICU Vital Signs Last 24 Hrs  T(C): 36.9 (14 Aug 2018 05:50), Max: 36.9 (13 Aug 2018 22:23)  T(F): 98.5 (14 Aug 2018 05:50), Max: 98.5 (14 Aug 2018 05:50)  HR: 68 (14 Aug 2018 05:50) (68 - 86)  BP: 104/67 (14 Aug 2018 05:50) (104/67 - 127/83)  RR: 18 (14 Aug 2018 05:50) (18 - 18)  SpO2: 99% (14 Aug 2018 05:50) (98% - 100%)      PHYSICAL EXAM:  GENERAL: No acute distress  HEAD:  Atraumatic, Normocephalic  EYES: EOMI, PERRLA, conjunctiva and sclera clear  NECK: Supple, no lymphadenopathy  CHEST/LUNG: CTAB; No wheezes, rales, or rhonchi  HEART: Regular rate and rhythm; No murmurs, rubs, or gallops  ABDOMEN: Soft, non-tender, non-distended; normal bowel sounds  EXTREMITIES:  2+ peripheral pulses b/l, No clubbing, cyanosis, or edema  NEUROLOGY: A&O x 4. Tremulous when arms outstretched, mildly improved from yesterday   SKIN: Areas of scar hyperpigmentation on face  PSYCH: More anxious than yesterday    MEDICATIONS:  MEDICATIONS  (STANDING):  ALPRAZolam 0.25 milliGRAM(s) Oral three times a day      ALLERGIES:  Allergies    No Known Allergies    Intolerances    Dilaudid (Pruritus; Rash)      LABS:                        13.7   6.03  )-----------( 214      ( 13 Aug 2018 12:45 )             40.0     08-13    135  |  96<L>  |  7   ----------------------------<  89  3.3<L>   |  18<L>  |  0.72    Ca    9.4      13 Aug 2018 12:41    TPro  8.0  /  Alb  4.6  /  TBili  0.8  /  DBili  x   /  AST  28  /  ALT  20  /  AlkPhos  50  08-13      Urinalysis Basic - ( 13 Aug 2018 12:51 )    Color: YELLOW / Appearance: CLEAR / S.014 / pH: 7.0  Gluc: NEGATIVE / Ketone: LARGE  / Bili: NEGATIVE / Urobili: NORMAL mg/dL   Blood: NEGATIVE / Protein: 20 mg/dL / Nitrite: NEGATIVE   Leuk Esterase: MODERATE / RBC: 0-2 / WBC 5-10   Sq Epi: OCC / Non Sq Epi: x / Bacteria: x        RADIOLOGY & ADDITIONAL TESTS: Reviewed.

## 2018-08-14 NOTE — DISCHARGE NOTE ADULT - MEDICATION SUMMARY - MEDICATIONS TO TAKE
I will START or STAY ON the medications listed below when I get home from the hospital:    LORazepam 1 mg oral tablet  -- 1 tab(s) by mouth every 8 hours  -- Indication: For Benzodiazepine withdrawal without complication    Lexapro 5 mg oral tablet  -- 1 tab(s) by mouth once a day  -- Indication: For Depression, unspecified depression type    OLANZapine 2.5 mg oral tablet  -- 1 tab(s) by mouth once a day (at bedtime)  -- Indication: For Anxiety

## 2018-08-14 NOTE — DISCHARGE NOTE ADULT - ADDITIONAL INSTRUCTIONS
Discharge to psych facility You will be discharged to a psychiatric facility and will be followed by the psychiatric team there.

## 2018-08-14 NOTE — DISCHARGE NOTE ADULT - PLAN OF CARE
No more withdrawal symptoms You had symptoms (tremors) which could be due to withdrawal of medication (xanax) you were taking at home and stopped abruptly. We started you on lower doses of similar medication here and are tapering. We monitored you to ensure resolution of withdrawal symptoms. You are medically cleared and can be transferred to a psych facility for further management. Manage agitation, ensure no harm to self or others You had abnormal behavior that could be due to underlying psychiatric disorder that is not being treated. We started you on some medications to help with agitation. We have medically cleared you and you can be transferred to a psychiatric facility for further management. Correct electrolytes You had low level of potassium in the blood, likely due to decreased intake of food. We supplemented it and your level is now normal. You had symptoms (tremors) which could be due to withdrawal of medication (xanax) you were taking at home and stopped abruptly. We started you on lower doses of a similar medication here and are tapering it down. We monitored you to ensure resolution of withdrawal symptoms. You are medically cleared and can be transferred to a psychiatric facility for further management. You had abnormal behavior that could be due to an underlying psychiatric disorder that is not being treated. We started you on some medications to help with agitation and depression. We have medically cleared you and you can be transferred to a psychiatric facility for further management.

## 2018-08-15 LAB
BUN SERPL-MCNC: 7 MG/DL — SIGNIFICANT CHANGE UP (ref 7–23)
CALCIUM SERPL-MCNC: 9.4 MG/DL — SIGNIFICANT CHANGE UP (ref 8.4–10.5)
CHLORIDE SERPL-SCNC: 100 MMOL/L — SIGNIFICANT CHANGE UP (ref 98–107)
CO2 SERPL-SCNC: 24 MMOL/L — SIGNIFICANT CHANGE UP (ref 22–31)
CREAT SERPL-MCNC: 0.83 MG/DL — SIGNIFICANT CHANGE UP (ref 0.5–1.3)
GLUCOSE SERPL-MCNC: 89 MG/DL — SIGNIFICANT CHANGE UP (ref 70–99)
HCT VFR BLD CALC: 36.2 % — SIGNIFICANT CHANGE UP (ref 34.5–45)
HGB BLD-MCNC: 12.4 G/DL — SIGNIFICANT CHANGE UP (ref 11.5–15.5)
MAGNESIUM SERPL-MCNC: 2.3 MG/DL — SIGNIFICANT CHANGE UP (ref 1.6–2.6)
MCHC RBC-ENTMCNC: 30.5 PG — SIGNIFICANT CHANGE UP (ref 27–34)
MCHC RBC-ENTMCNC: 34.3 % — SIGNIFICANT CHANGE UP (ref 32–36)
MCV RBC AUTO: 88.9 FL — SIGNIFICANT CHANGE UP (ref 80–100)
NRBC # FLD: 0 — SIGNIFICANT CHANGE UP
PHOSPHATE SERPL-MCNC: 4.6 MG/DL — HIGH (ref 2.5–4.5)
PLATELET # BLD AUTO: 190 K/UL — SIGNIFICANT CHANGE UP (ref 150–400)
PMV BLD: 10.3 FL — SIGNIFICANT CHANGE UP (ref 7–13)
POTASSIUM SERPL-MCNC: 3.7 MMOL/L — SIGNIFICANT CHANGE UP (ref 3.5–5.3)
POTASSIUM SERPL-SCNC: 3.7 MMOL/L — SIGNIFICANT CHANGE UP (ref 3.5–5.3)
RBC # BLD: 4.07 M/UL — SIGNIFICANT CHANGE UP (ref 3.8–5.2)
RBC # FLD: 11.8 % — SIGNIFICANT CHANGE UP (ref 10.3–14.5)
SODIUM SERPL-SCNC: 137 MMOL/L — SIGNIFICANT CHANGE UP (ref 135–145)
WBC # BLD: 5.33 K/UL — SIGNIFICANT CHANGE UP (ref 3.8–10.5)
WBC # FLD AUTO: 5.33 K/UL — SIGNIFICANT CHANGE UP (ref 3.8–10.5)

## 2018-08-15 PROCEDURE — 99232 SBSQ HOSP IP/OBS MODERATE 35: CPT | Mod: GC

## 2018-08-15 PROCEDURE — 99233 SBSQ HOSP IP/OBS HIGH 50: CPT

## 2018-08-15 RX ADMIN — Medication 1.5 MILLIGRAM(S): at 21:32

## 2018-08-15 RX ADMIN — ESCITALOPRAM OXALATE 5 MILLIGRAM(S): 10 TABLET, FILM COATED ORAL at 14:17

## 2018-08-15 RX ADMIN — OLANZAPINE 2.5 MILLIGRAM(S): 15 TABLET, FILM COATED ORAL at 21:31

## 2018-08-15 RX ADMIN — Medication 2 MILLIGRAM(S): at 05:58

## 2018-08-15 RX ADMIN — Medication 1.5 MILLIGRAM(S): at 14:04

## 2018-08-15 NOTE — PROGRESS NOTE BEHAVIORAL HEALTH - NSBHFUPINTERVALHXFT_PSY_A_CORE
Patient is a 54yo F seen today for follow up of active paranoia and psychosis. No behavioral events or PRN medications required overnight. Patient is complaint with medications.     On encounter patient feels "better" continued paranoia and thoughts that she would burn the building down but did have them last night, again does not feel safe alone though is unclear/disorganized about what may happen to her if she is left alone, continues to state that "I'd rather end myself than hurt someone else" repeatedly though denies desire to hurt anyone. Denies headache nausea sweating today, mild tremor remains.

## 2018-08-15 NOTE — PROGRESS NOTE ADULT - PROBLEM SELECTOR PLAN 2
Psych following. Will clear medically first for BZD withdrawal and then can transfer to [facility].

## 2018-08-15 NOTE — PROGRESS NOTE ADULT - SUBJECTIVE AND OBJECTIVE BOX
INTERVAL HPI/OVERNIGHT EVENTS: No acute events overnight.     SUBJECTIVE: Patient seen and examined at bedside.     CONSTITUTIONAL: No weakness, fevers or chills  EYES/ENT: No visual changes;  No vertigo or throat pain   NECK: No pain or stiffness  RESPIRATORY: No cough, wheezing, hemoptysis; No shortness of breath  CARDIOVASCULAR: No chest pain or palpitations  GASTROINTESTINAL: No abdominal or epigastric pain. No nausea, vomiting, or hematemesis; No diarrhea or constipation. No melena or hematochezia.  GENITOURINARY: No dysuria, frequency or hematuria  NEUROLOGICAL: No numbness or weakness  SKIN: No itching, rashes    OBJECTIVE:    VITAL SIGNS:  ICU Vital Signs Last 24 Hrs  T(C): 36.9 (15 Aug 2018 04:03), Max: 37.2 (14 Aug 2018 17:12)  T(F): 98.5 (15 Aug 2018 04:03), Max: 99 (14 Aug 2018 17:12)  HR: 64 (15 Aug 2018 04:03) (62 - 78)  BP: 110/65 (15 Aug 2018 04:03) (94/64 - 120/69)  RR: 17 (15 Aug 2018 04:03) (16 - 18)  SpO2: 100% (15 Aug 2018 04:03) (99% - 100%)        PHYSICAL EXAM:    General: NAD  HEENT: NC/AT; PERRL, clear conjunctiva  Neck: supple  Respiratory: CTA b/l  Cardiovascular: +S1/S2; RRR  Abdomen: soft, NT/ND; +BS x4  Extremities: WWP, 2+ peripheral pulses b/l; no LE edema  Skin: normal color and turgor; no rash  Neurological:    MEDICATIONS:  MEDICATIONS  (STANDING):  escitalopram 5 milliGRAM(s) Oral daily  LORazepam     Tablet 2 milliGRAM(s) Oral every 8 hours  OLANZapine 2.5 milliGRAM(s) Oral at bedtime    MEDICATIONS  (PRN):  acetaminophen   Tablet. 650 milliGRAM(s) Oral every 6 hours PRN Severe Pain (7 - 10)  hydrOXYzine hydrochloride 50 milliGRAM(s) Oral every 6 hours PRN Anxiety  LORazepam   Injectable 2 milliGRAM(s) IV Push every 1 hour PRN Symptom-triggered: each CIWA -Ar score 8 or GREATER  OLANZapine 5 milliGRAM(s) Oral every 6 hours PRN agitation      ALLERGIES:  Allergies    No Known Allergies    Intolerances    Dilaudid (Pruritus; Rash)      LABS:                        11.7   5.90  )-----------( 178      ( 14 Aug 2018 06:00 )             34.7     08-14    134<L>  |  99  |  8   ----------------------------<  88  4.0   |  24  |  0.76    Ca    9.0      14 Aug 2018 06:00  Phos  4.0     08-14  Mg     2.1     08-14    TPro  8.0  /  Alb  4.6  /  TBili  0.8  /  DBili  x   /  AST  28  /  ALT  20  /  AlkPhos  50  08-13      Urinalysis Basic - ( 13 Aug 2018 12:51 )    Color: YELLOW / Appearance: CLEAR / S.014 / pH: 7.0  Gluc: NEGATIVE / Ketone: LARGE  / Bili: NEGATIVE / Urobili: NORMAL mg/dL   Blood: NEGATIVE / Protein: 20 mg/dL / Nitrite: NEGATIVE   Leuk Esterase: MODERATE / RBC: 0-2 / WBC 5-10   Sq Epi: OCC / Non Sq Epi: x / Bacteria: x        RADIOLOGY & ADDITIONAL TESTS: Reviewed. INTERVAL HPI/OVERNIGHT EVENTS: No acute events overnight. Patient slept well, according to PCA. CIWA in 7s.     SUBJECTIVE: Patient seen and examined at bedside. No complaints.    CONSTITUTIONAL: No weakness, fevers or chills  EYES/ENT: No visual changes  NECK: No pain or stiffness  RESPIRATORY: No cough, wheezing, hemoptysis; No shortness of breath  CARDIOVASCULAR: No chest pain or palpitations  GASTROINTESTINAL: No abdominal or epigastric pain. No nausea, vomiting, or hematemesis; No diarrhea or constipation.   GENITOURINARY: No urinary changes  NEUROLOGICAL: No weakness    OBJECTIVE:    VITAL SIGNS:  ICU Vital Signs Last 24 Hrs  T(C): 36.9 (15 Aug 2018 04:03), Max: 37.2 (14 Aug 2018 17:12)  T(F): 98.5 (15 Aug 2018 04:03), Max: 99 (14 Aug 2018 17:12)  HR: 64 (15 Aug 2018 04:03) (62 - 78)  BP: 110/65 (15 Aug 2018 04:03) (94/64 - 120/69)  RR: 17 (15 Aug 2018 04:03) (16 - 18)  SpO2: 100% (15 Aug 2018 04:03) (99% - 100%)        PHYSICAL EXAM:    General: NAD, lying in bed comfortably   HEENT: NC/AT; PERRL  Neck: supple  Respiratory: CTA b/l  Cardiovascular: +S1/S2; RRR  Abdomen: soft, NT/ND; +BS x4  Extremities: WWP, 2+ peripheral pulses b/l; no LE edema  Skin: hyperpigmentation scars on face  Neurological: lethargic from just waking up, answers appropriately     MEDICATIONS:  MEDICATIONS  (STANDING):  escitalopram 5 milliGRAM(s) Oral daily  LORazepam     Tablet 2 milliGRAM(s) Oral every 8 hours  OLANZapine 2.5 milliGRAM(s) Oral at bedtime    MEDICATIONS  (PRN):  acetaminophen   Tablet. 650 milliGRAM(s) Oral every 6 hours PRN Severe Pain (7 - 10)  hydrOXYzine hydrochloride 50 milliGRAM(s) Oral every 6 hours PRN Anxiety  LORazepam   Injectable 2 milliGRAM(s) IV Push every 1 hour PRN Symptom-triggered: each CIWA -Ar score 8 or GREATER  OLANZapine 5 milliGRAM(s) Oral every 6 hours PRN agitation      ALLERGIES:  Allergies    No Known Allergies    Intolerances    Dilaudid (Pruritus; Rash)      LABS:                        11.7   5.90  )-----------( 178      ( 14 Aug 2018 06:00 )             34.7     08-14    134<L>  |  99  |  8   ----------------------------<  88  4.0   |  24  |  0.76    Ca    9.0      14 Aug 2018 06:00  Phos  4.0     08-14  Mg     2.1     08-14    TPro  8.0  /  Alb  4.6  /  TBili  0.8  /  DBili  x   /  AST  28  /  ALT  20  /  AlkPhos  50  08-13      Urinalysis Basic - ( 13 Aug 2018 12:51 )    Color: YELLOW / Appearance: CLEAR / S.014 / pH: 7.0  Gluc: NEGATIVE / Ketone: LARGE  / Bili: NEGATIVE / Urobili: NORMAL mg/dL   Blood: NEGATIVE / Protein: 20 mg/dL / Nitrite: NEGATIVE   Leuk Esterase: MODERATE / RBC: 0-2 / WBC 5-10   Sq Epi: OCC / Non Sq Epi: x / Bacteria: x        RADIOLOGY & ADDITIONAL TESTS: Reviewed.

## 2018-08-15 NOTE — PROGRESS NOTE ADULT - PROBLEM SELECTOR PLAN 4
increasingly agitated and making comments suggestive of suicidal/homicidal ideation. Placed on constant obs. More stable today, likely due to medications. Started on escitalopram and olanzapine as per psych recs. Placed on constant obs.

## 2018-08-15 NOTE — PROGRESS NOTE ADULT - PROBLEM SELECTOR PLAN 1
Patient off xanax since Friday, was on 1mg TID. Switched from 0.25mg TID to ativan taper starting with 2 q8. Assess for any BZD withdrawal. Vitals stable. Patient off xanax since Friday, was on 1mg TID. Switched from 0.25mg TID to ativan taper starting with 2 q8. Can taper down today. Assess for any BZD withdrawal. Vitals stable.

## 2018-08-16 ENCOUNTER — INPATIENT (INPATIENT)
Facility: HOSPITAL | Age: 53
LOS: 11 days | Discharge: HOME | End: 2018-08-28
Attending: PSYCHIATRY & NEUROLOGY | Admitting: PSYCHIATRY & NEUROLOGY
Payer: MEDICAID

## 2018-08-16 VITALS
SYSTOLIC BLOOD PRESSURE: 95 MMHG | OXYGEN SATURATION: 99 % | HEART RATE: 73 BPM | RESPIRATION RATE: 18 BRPM | DIASTOLIC BLOOD PRESSURE: 63 MMHG | TEMPERATURE: 99 F

## 2018-08-16 VITALS — TEMPERATURE: 98 F | HEIGHT: 66 IN | WEIGHT: 129.19 LBS

## 2018-08-16 DIAGNOSIS — Z90.710 ACQUIRED ABSENCE OF BOTH CERVIX AND UTERUS: Chronic | ICD-10-CM

## 2018-08-16 DIAGNOSIS — F33.9 MAJOR DEPRESSIVE DISORDER, RECURRENT, UNSPECIFIED: ICD-10-CM

## 2018-08-16 PROBLEM — F32.9 MAJOR DEPRESSIVE DISORDER, SINGLE EPISODE, UNSPECIFIED: Chronic | Status: ACTIVE | Noted: 2018-08-13

## 2018-08-16 PROBLEM — F41.9 ANXIETY DISORDER, UNSPECIFIED: Chronic | Status: ACTIVE | Noted: 2018-08-13

## 2018-08-16 PROCEDURE — 99233 SBSQ HOSP IP/OBS HIGH 50: CPT

## 2018-08-16 PROCEDURE — 99223 1ST HOSP IP/OBS HIGH 75: CPT

## 2018-08-16 PROCEDURE — 99238 HOSP IP/OBS DSCHRG MGMT 30/<: CPT

## 2018-08-16 RX ORDER — ESCITALOPRAM OXALATE 10 MG/1
5 TABLET, FILM COATED ORAL DAILY
Qty: 0 | Refills: 0 | Status: DISCONTINUED | OUTPATIENT
Start: 2018-08-16 | End: 2018-08-17

## 2018-08-16 RX ORDER — DIPHENHYDRAMINE HCL 50 MG
50 CAPSULE ORAL EVERY 6 HOURS
Qty: 0 | Refills: 0 | Status: DISCONTINUED | OUTPATIENT
Start: 2018-08-16 | End: 2018-08-28

## 2018-08-16 RX ORDER — OLANZAPINE 15 MG/1
2.5 TABLET, FILM COATED ORAL AT BEDTIME
Qty: 0 | Refills: 0 | Status: DISCONTINUED | OUTPATIENT
Start: 2018-08-16 | End: 2018-08-22

## 2018-08-16 RX ORDER — HALOPERIDOL DECANOATE 100 MG/ML
5 INJECTION INTRAMUSCULAR EVERY 6 HOURS
Qty: 0 | Refills: 0 | Status: DISCONTINUED | OUTPATIENT
Start: 2018-08-16 | End: 2018-08-28

## 2018-08-16 RX ADMIN — Medication 1.5 MILLIGRAM(S): at 05:43

## 2018-08-16 RX ADMIN — ESCITALOPRAM OXALATE 5 MILLIGRAM(S): 10 TABLET, FILM COATED ORAL at 12:32

## 2018-08-16 RX ADMIN — OLANZAPINE 2.5 MILLIGRAM(S): 15 TABLET, FILM COATED ORAL at 20:48

## 2018-08-16 RX ADMIN — Medication 1 MILLIGRAM(S): at 21:17

## 2018-08-16 RX ADMIN — Medication 1 MILLIGRAM(S): at 14:00

## 2018-08-16 NOTE — PROGRESS NOTE BEHAVIORAL HEALTH - NSBHCHARTREVIEWLAB_PSY_A_CORE FT
11.7   5.90  )-----------( 178      ( 14 Aug 2018 06:00 )             34.7     08-14    134<L>  |  99  |  8   ----------------------------<  88  4.0   |  24  |  0.76    Ca    9.0      14 Aug 2018 06:00  Phos  4.0     08-14  Mg     2.1     08-14    TPro  8.0  /  Alb  4.6  /  TBili  0.8  /  DBili  x   /  AST  28  /  ALT  20  /  AlkPhos  50  08-13    Urinalysis Basic - ( 13 Aug 2018 12:51 )    Color: YELLOW / Appearance: CLEAR / S.014 / pH: 7.0  Gluc: NEGATIVE / Ketone: LARGE  / Bili: NEGATIVE / Urobili: NORMAL mg/dL   Blood: NEGATIVE / Protein: 20 mg/dL / Nitrite: NEGATIVE   Leuk Esterase: MODERATE / RBC: 0-2 / WBC 5-10   Sq Epi: OCC / Non Sq Epi: x / Bacteria: x
.  LABS:                         12.4   5.33  )-----------( 190      ( 15 Aug 2018 06:03 )             36.2     08-15    137  |  100  |  7   ----------------------------<  89  3.7   |  24  |  0.83    Ca    9.4      15 Aug 2018 06:03  Phos  4.6     08-15  Mg     2.3     08-15
08-15    137  |  100  |  7   ----------------------------<  89  3.7   |  24  |  0.83    Ca    9.4      15 Aug 2018 06:03  Phos  4.6     08-15  Mg     2.3     08-15                          12.4   5.33  )-----------( 190      ( 15 Aug 2018 06:03 )             36.2

## 2018-08-16 NOTE — PROGRESS NOTE BEHAVIORAL HEALTH - THOUGHT CONTENT
Preoccupations/Hopelessness/Suicidality/Delusions
Suicidality/Hopelessness/Delusions/Preoccupations
Preoccupations/Ruminations

## 2018-08-16 NOTE — PROGRESS NOTE BEHAVIORAL HEALTH - NSBHCONSULTMEDSEVERE_PSY_A_CORE FT
PRN Zyprexa 5mg PO/IM q6h for agitation

## 2018-08-16 NOTE — PROGRESS NOTE BEHAVIORAL HEALTH - NSBHFUPINTERVALCCFT_PSY_A_CORE
"I would feel safer if I wasn't alone"
"Please don't leave me alone. I'm not safe."
"I just want to get better."

## 2018-08-16 NOTE — PROGRESS NOTE BEHAVIORAL HEALTH - NSBHCHARTREVIEWIMAGING_PSY_A_CORE FT
< from: CT Head No Cont (08.13.18 @ 15:50) >    EXAM:  CT BRAIN        PROCEDURE DATE:  Aug 13 2018         INTERPRETATION:  History: Altered mental status.    Technique:  Multiple axial sections were acquired from the base of the skull to the   vertex without contrast enhancement. Coronal and sagittal reconstructed   images were performed as well.    Findings:  The lateral ventricles have a normal configuration.    There is no evidence of acute hemorrhage, mass or mass-effect in the   posterior fossa or in the supratentorial region.    Evaluation of the osseous structures with the appropriate window appears   unremarkable.    The visualized paranasal sinuses mastoid and middle ear regions appear   clear.    Impression:  Unremarkable noncontrast head CT.    < end of copied text >

## 2018-08-16 NOTE — PROGRESS NOTE BEHAVIORAL HEALTH - NSBHCHARTREVIEWINVESTIGATE_PSY_A_CORE FT
< from: 12 Lead ECG (08.13.18 @ 17:36) >    Ventricular Rate 77 BPM    Atrial Rate 77 BPM    P-R Interval 140 ms    QRS Duration 82 ms    Q-T Interval 376 ms    QTC Calculation(Bezet) 425 ms    P Axis 68 degrees    R Axis 52 degrees    T Axis 31 degrees    Diagnosis Line Normal sinus rhythm  T wave abnormality, consider anterior ischemia  Abnormal ECG

## 2018-08-16 NOTE — PROGRESS NOTE BEHAVIORAL HEALTH - NSBHCONSULTMEDANXIETY_PSY_A_CORE FT
PRN Atarax 50mg PO q6h for anxiety

## 2018-08-16 NOTE — PROGRESS NOTE BEHAVIORAL HEALTH - PRN MEDICATIONS SINCE LAST EVAL
----- Message from Sg Balbuena MD sent at 8/11/2018  2:09 PM CDT -----  Notify patient of normal labs      
no

## 2018-08-16 NOTE — PROGRESS NOTE BEHAVIORAL HEALTH - RISK ASSESSMENT
Pt is at elevated imminent risk of self-harm given her active psychosis, depressive mood episode and substance use withdrawal. She is also suffering from psychosocial stressors with recent unemployment and current isolation. She reports a recent suicide attempt and is not adherent to any current antidepressants/antipsychotics. She has supportive family and no access to means, but is actively disorganized and has recently lost 20 lbs, concerning for her ability to care for herself.
Pt is at elevated imminent risk of self-harm given her active psychosis, depressive mood episode and substance use withdrawal. She is also suffering from psychosocial stressors with recent unemployment and current isolation. She reports a recent suicide attempt and is not adherent to any current antidepressants/antipsychotics. She has supportive family and no access to means, but is actively disorganized and has recently lost 20 lbs, concerning for her ability to care for herself.
Pt is at elevated imminent risk of self-harm given her active psychosis, depressive mood episode and substance use withdrawal. She is also suffering from psychosocial stressors with recent unemployment and current isolation. She reports a recent suicide attempt and is not adherent to any current antidepressants/antipsychotics. She has supportive family and no access to means, but is actively disorganized and has recently lost 20 lbs, concerning for her ability to care for herself. When asked about SI, she does not elaborate but is unable to contract for safety.

## 2018-08-16 NOTE — PROGRESS NOTE ADULT - SUBJECTIVE AND OBJECTIVE BOX
INTERVAL HPI/OVERNIGHT EVENTS: No acute events overnight.     SUBJECTIVE: Patient seen and examined at bedside.     CONSTITUTIONAL: No weakness, fevers or chills  EYES/ENT: No visual changes;  No vertigo or throat pain   NECK: No pain or stiffness  RESPIRATORY: No cough, wheezing, hemoptysis; No shortness of breath  CARDIOVASCULAR: No chest pain or palpitations  GASTROINTESTINAL: No abdominal or epigastric pain. No nausea, vomiting, or hematemesis; No diarrhea or constipation. No melena or hematochezia.  GENITOURINARY: No dysuria, frequency or hematuria  NEUROLOGICAL: No numbness or weakness  SKIN: No itching, rashes    OBJECTIVE:    VITAL SIGNS:  ICU Vital Signs Last 24 Hrs  T(C): 36.2 (16 Aug 2018 04:09), Max: 37.1 (15 Aug 2018 20:10)  T(F): 97.1 (16 Aug 2018 04:09), Max: 98.8 (15 Aug 2018 20:10)  HR: 73 (16 Aug 2018 04:09) (70 - 81)  BP: 101/66 (16 Aug 2018 04:09) (97/65 - 117/77)  RR: 17 (16 Aug 2018 04:09) (17 - 18)  SpO2: 97% (16 Aug 2018 04:09) (97% - 100%)        PHYSICAL EXAM:    General: NAD  HEENT: NC/AT; PERRL, clear conjunctiva  Neck: supple  Respiratory: CTA b/l  Cardiovascular: +S1/S2; RRR  Abdomen: soft, NT/ND; +BS x4  Extremities: WWP, 2+ peripheral pulses b/l; no LE edema  Skin: normal color and turgor; no rash  Neurological:    MEDICATIONS:  MEDICATIONS  (STANDING):  escitalopram 5 milliGRAM(s) Oral daily  LORazepam     Tablet 1.5 milliGRAM(s) Oral every 8 hours  OLANZapine 2.5 milliGRAM(s) Oral at bedtime    MEDICATIONS  (PRN):  acetaminophen   Tablet. 650 milliGRAM(s) Oral every 6 hours PRN Severe Pain (7 - 10)  hydrOXYzine hydrochloride 50 milliGRAM(s) Oral every 6 hours PRN Anxiety  LORazepam   Injectable 2 milliGRAM(s) IV Push every 1 hour PRN Symptom-triggered: each CIWA -Ar score 8 or GREATER  OLANZapine 5 milliGRAM(s) Oral every 6 hours PRN agitation      ALLERGIES:  Allergies    No Known Allergies    Intolerances    Dilaudid (Pruritus; Rash)      LABS:                        12.4   5.33  )-----------( 190      ( 15 Aug 2018 06:03 )             36.2     08-15    137  |  100  |  7   ----------------------------<  89  3.7   |  24  |  0.83    Ca    9.4      15 Aug 2018 06:03  Phos  4.6     08-15  Mg     2.3     08-15            RADIOLOGY & ADDITIONAL TESTS: Reviewed.

## 2018-08-16 NOTE — PROGRESS NOTE ADULT - ATTENDING COMMENTS
see dc note for details
psych input appreciated.  she admits that she is fearful she could hurt herself or someone else.  thus will place on CO.  also will convert to formal CIWA protocol along with ativan.
less tremor.  more cooperative.  not "scoring" where she needs prn dosing of benzo's.    imp- medically stable for continued treatment of psychosis on the psych unit.

## 2018-08-16 NOTE — PROGRESS NOTE BEHAVIORAL HEALTH - CASE SUMMARY
patient exhibits odd fears, thought d/o, paranoia, intrusive thoughts to burn down things. is amenable to voluntary hospitalization because she feels she may be a danger to other people.
agre with above, patient appears to exhibit psychosis, has thought insertion to burn buildings and her house down, feels she may be a "danger to others" if she is left alone, has referential thuoghts from the TV, and has disorganized thought process. Given that patient has been on xanax high dose for 20+y, agus need to taper off of this as some degree of patient's distress (headache, tremors, nausea, psychosis) may be informed by benzo withdrawal. Given patient's paranoia and homicidal thoughts, agus keep on 1:1.

## 2018-08-16 NOTE — PROGRESS NOTE BEHAVIORAL HEALTH - NSBHFUPINTERVALHXFT_PSY_A_CORE
Patient is a 54yo F seen today for follow up of active paranoia and psychosis. No behavioral events or PRN medications required overnight. Patient is complaint with medications.     On encounter patient is tearful and ruminating on how deeply she misses seeing her daughter and grandson. She asks the interviewer to help her get to a psychiatric hospital as soon as possible because she wants feel less depressed and see her family again. She feels guilt about not being depressed and not being able to support her daughter and grandson in person. She states that she is hopeful that with proper treatment her feelings of depression will go away. She is future-oriented with goals of returning to work and living independently again. She has not had intrusive thoughts about burning things this morning (had thoughts of burning buildings yesterday), but notes constant anxiety about being on 9th floor of the hospital due to her fear of heights. She denies feelings of paranoia or AVT hallucinations. She denies SI/HI/SIB. She states that she feels safe and is eager to begin more intensive psychiatric treatment.

## 2018-08-16 NOTE — PROGRESS NOTE BEHAVIORAL HEALTH - SUMMARY
52 y/o woman with unknown PPHx (possible recent OD on gabapentin with hospital admission in [place]), PMHx of chronic back pain, single, domiciled with sister, unemployed, admitted to [facility] after being BIB EMS after her sister called 911 for bizarre behavior.    Pt has no known history of schizophrenia/psychosis, on admission presented actively psychotic with paranoia, hallucinations and delusions in the context of worsening depression for several months (reduced sleep, appetite, energy, anhedonia and a possible suicide attempt several weeks ago). She currently denies sxs of active psychosis, but appears guarded with suspicion for continued active psychosis. Her bilateral tremor has remained stable, and she is not demonstrating other symptoms of benzo withdrawal at this time.    Patient has completed paperwork for voluntary inpatient psychiatric hospitalization.   1. c/w benzo taper, 1mg TID today   2. c/w 1:1 CO.  3. c/w Lexapro 5mg PO QD.  4. c/w 2.5mg Zyprexa QHS.  5. PRN Atarax 50mg PO q6h for anxiety  6. PRN Zyprexa 5mg PO/IM q6h for agitation
52 y/o woman with unknown PPHx (possible recent OD on gabapentin with hospital admission in [place]), PMHx of chronic back pain, single, domiciled with sister, unemployed, admitted to [facility] after being BIB EMS after her sister called 911 for bizarre behavior.    Pt has no known history of schizophrenia/psychosis, but appears actively psychotic with paranoia, hallucinations and delusions in the context of worsening depression for several months with associated depressive symptoms including reduced sleep, appetite, energy, anhedonia and a possible suicide attempt several weeks ago. Pt is also likely suffering from benzo withdrawal after 5-7 days without xanax, which she has used for 20 years. She is actively tremulous with headache, restlessness, anxiety and disorientation to date. The time course could be attributed to MDD with psychotic features, but there is concern for active benzo withdrawal contributing to the pt's active symptoms.     1. c/w benzo taper, 1.5mg TID today and down to 1mg TID tomorrow  2. c/w 1:1 CO.  3. c/w Lexapro 5mg PO QD.  4. c/w 2.5mg Zyprexa QHS.  5. PRN Atarax 50mg PO q6h for anxiety  6. PRN Zyprexa 5mg PO/IM q6h for agitation
54 y/o woman with unknown PPHx (possible recent OD on gabapentin with hospital admission in [place]), PMHx of chronic back pain, single, domiciled with sister, unemployed, admitted to [facility] after being BIB EMS after her sister called 911 for bizarre behavior.    Pt has no known history of schizophrenia/psychosis, but appears actively psychotic with paranoia, hallucinations and delusions in the context of worsening depression for several months with associated depressive symptoms including reduced sleep, appetite, energy, anhedonia and a possible suicide attempt several weeks ago. Pt is also likely suffering from benzo withdrawal after 5-7 days without xanax, which she has used for 20 years. She is actively tremulous with headache, restlessness, anxiety and disorientation to date. The time course could be attributed to MDD with psychotic features, but there is concern for active benzo withdrawal contributing to the pt's active symptoms.     1. Manage benzodiazepine withdrawal with standing Ativan taper: start at Ativan PO 2mg q8h and decrease by 0.5mg each following day.  2. Place on 1:1 CO.  3. Start Lexapro 5mg PO QD.  4. Start 2.5mg Zyprexa QHS.  5. PRN Atarax 50mg PO q6h for anxiety  6. PRN Zyprexa 5mg PO/IM q6h for agitation

## 2018-08-16 NOTE — PROGRESS NOTE BEHAVIORAL HEALTH - NSBHCONSULTFOLLOWAFTERCARE_PSY_A_CORE FT
patient 9.13 voluntary hospitalization, OK to go today, s/o provided to [facility]

## 2018-08-16 NOTE — PROGRESS NOTE BEHAVIORAL HEALTH - SECONDARY DX1
Benzodiazepine withdrawal without complication

## 2018-08-16 NOTE — PROGRESS NOTE BEHAVIORAL HEALTH - PRIMARY DX
Psychosis, unspecified psychosis type

## 2018-08-16 NOTE — PROGRESS NOTE BEHAVIORAL HEALTH - NSBHCHARTREVIEWVS_PSY_A_CORE FT
Vital Signs Last 24 Hrs  T(C): 36.9 (14 Aug 2018 05:50), Max: 36.9 (13 Aug 2018 22:23)  T(F): 98.5 (14 Aug 2018 05:50), Max: 98.5 (14 Aug 2018 05:50)  HR: 68 (14 Aug 2018 05:50) (68 - 86)  BP: 104/67 (14 Aug 2018 05:50) (104/67 - 111/78)  BP(mean): --  RR: 18 (14 Aug 2018 05:50) (18 - 18)  SpO2: 99% (14 Aug 2018 05:50) (99% - 100%)
Vital Signs Last 24 Hrs  T(C): 37.1 (16 Aug 2018 08:01), Max: 37.1 (15 Aug 2018 20:10)  T(F): 98.8 (16 Aug 2018 08:01), Max: 98.8 (15 Aug 2018 20:10)  HR: 72 (16 Aug 2018 08:01) (70 - 81)  BP: 100/61 (16 Aug 2018 08:01) (97/65 - 108/69)  BP(mean): --  RR: 17 (16 Aug 2018 08:01) (17 - 18)  SpO2: 98% (16 Aug 2018 08:01) (97% - 100%)
Vital Signs Last 24 Hrs  T(C): 37 (15 Aug 2018 16:00), Max: 37.2 (14 Aug 2018 17:12)  T(F): 98.6 (15 Aug 2018 16:00), Max: 99 (14 Aug 2018 17:12)  HR: 80 (15 Aug 2018 16:00) (62 - 81)  BP: 100/64 (15 Aug 2018 16:00) (100/64 - 120/69)  BP(mean): --  RR: 17 (15 Aug 2018 12:05) (17 - 18)  SpO2: 100% (15 Aug 2018 12:05) (99% - 100%)

## 2018-08-16 NOTE — PROGRESS NOTE BEHAVIORAL HEALTH - NSBHCONSULTMEDS_PSY_A_CORE FT
Patient has completed paperwork for voluntary inpatient psychiatric hospitalization.   1. c/w benzo taper, 1mg TID today   2. c/w 1:1 CO.  3. c/w Lexapro 5mg PO QD.  4. c/w 2.5mg Zyprexa QHS.  5. PRN Atarax 50mg PO q6h for anxiety  6. PRN Zyprexa 5mg PO/IM q6h for agitation
1. c/w benzo taper, 1.5mg TID today and down to 1mg TID tomorrow  2. c/w 1:1 CO.  3. c/w Lexapro 5mg PO QD.  4. c/w 2.5mg Zyprexa QHS.  5. PRN Atarax 50mg PO q6h for anxiety  6. PRN Zyprexa 5mg PO/IM q6h for agitation
1. Manage benzodiazepine withdrawal with standing Ativan taper: start at Ativan PO 2mg q8h and decrease by 0.5mg each following day.  2. Place on 1:1 CO.  3. Start Lexapro 5mg PO QD.  4. Start 2.5mg Zyprexa QHS.  5. PRN Atarax 50mg PO q6h for anxiety  6. PRN Zyprexa 5mg PO/IM q6h for agitation

## 2018-08-16 NOTE — PROGRESS NOTE BEHAVIORAL HEALTH - THOUGHT PROCESS
Impaired reasoning/Illogical/Tangential/Disorganized
Impaired reasoning/Disorganized/Tangential/Illogical
Perseverative/Normal reasoning/Linear

## 2018-08-16 NOTE — PROGRESS NOTE ADULT - PROBLEM SELECTOR PLAN 1
Patient off xanax since Friday, was on 1mg TID. Switched from 0.25mg TID to ativan taper starting with 2 q8, currently on 1.5 q8. Will bring down to 1 q8 today. Assess for any BZD withdrawal. Vitals stable.

## 2018-08-17 LAB
CHOLEST SERPL-MCNC: 158 MG/DL — SIGNIFICANT CHANGE UP (ref 120–199)
HDLC SERPL-MCNC: 45 MG/DL — SIGNIFICANT CHANGE UP (ref 45–65)
LIPID PNL WITH DIRECT LDL SERPL: 100 MG/DL — SIGNIFICANT CHANGE UP
TRIGL SERPL-MCNC: 73 MG/DL — SIGNIFICANT CHANGE UP (ref 10–149)

## 2018-08-17 PROCEDURE — 99232 SBSQ HOSP IP/OBS MODERATE 35: CPT

## 2018-08-17 PROCEDURE — 99223 1ST HOSP IP/OBS HIGH 75: CPT

## 2018-08-17 RX ORDER — ESCITALOPRAM OXALATE 10 MG/1
10 TABLET, FILM COATED ORAL DAILY
Qty: 0 | Refills: 0 | Status: DISCONTINUED | OUTPATIENT
Start: 2018-08-18 | End: 2018-08-21

## 2018-08-17 RX ORDER — HYDROXYZINE HCL 10 MG
50 TABLET ORAL EVERY 6 HOURS
Qty: 0 | Refills: 0 | Status: DISCONTINUED | OUTPATIENT
Start: 2018-08-17 | End: 2018-08-23

## 2018-08-17 RX ORDER — TRAZODONE HCL 50 MG
50 TABLET ORAL AT BEDTIME
Qty: 0 | Refills: 0 | Status: DISCONTINUED | OUTPATIENT
Start: 2018-08-17 | End: 2018-08-20

## 2018-08-17 RX ADMIN — Medication 50 MILLIGRAM(S): at 20:33

## 2018-08-17 RX ADMIN — Medication 1 MILLIGRAM(S): at 13:28

## 2018-08-17 RX ADMIN — Medication 1 MILLIGRAM(S): at 06:34

## 2018-08-17 RX ADMIN — ESCITALOPRAM OXALATE 5 MILLIGRAM(S): 10 TABLET, FILM COATED ORAL at 08:51

## 2018-08-17 RX ADMIN — Medication 1 MILLIGRAM(S): at 22:01

## 2018-08-17 RX ADMIN — OLANZAPINE 2.5 MILLIGRAM(S): 15 TABLET, FILM COATED ORAL at 20:33

## 2018-08-17 NOTE — CONSULT NOTE ADULT - SUBJECTIVE AND OBJECTIVE BOX
cc: anxiety    HPI: hospital course - This is a 53 year old female with no known PMH and unclear psychiatric history (depression and anxiety as per patient) who presented with bizarre behavior as per her sister and was admitted to medicine for possible benzodiazepine withdrawal, as the patient abruptly stopped taking xanax on Friday. On admission, patient was noted to have tremors in her arms. She was started on xanax 0.25mg TID (was on 1mg TID at home). She became more psychotic with paranoia and anxiety, and was placed on constant observation because of threats to harm herself and others. She was switched to an ativan taper with CIWA protocol per psychiatry recommendations. She was also started on lexapro 5mg daily, zyprexa 2.5mg qHS, as well as PRN atarax for anxiety and PRN zyprexa for agitation. Vitals remained stable. She was initially hypokalemic to 3.3 likely from decreased PO intake, was supplemented with potassium and level corrected. Patient initially scoring 7 on CIWA which decreased to 4. She has not needed any PRN ativan and is currently on standing dose of 1mg q8 PO. Her withdrawal symptoms have improved overall and patient ready for discharge to psychiatric facility. no events since arrival to [facility], has no complaints. has not required PRNs    Allergies: NKDA  Intolerance Dilaudid    PMHX: per HPI  FHX: NC  Social HX: benzo dependence    ROS:  No HA/DZ  No Vision changes   No CP, SOB  No N/V/D  No Edema  No Rash  NO weakness, numbness    MEDICATIONS  (STANDING):  escitalopram 5 milliGRAM(s) Oral daily  LORazepam     Tablet 1 milliGRAM(s) Oral every 8 hours  OLANZapine 2.5 milliGRAM(s) Oral at bedtime    MEDICATIONS  (PRN):  diphenhydrAMINE   Injectable 50 milliGRAM(s) IntraMuscular every 6 hours PRN Agitation  haloperidol    Injectable 5 milliGRAM(s) IntraMuscular every 6 hours PRN Agitation      T(C): 37.1 (08-17-18 @ 08:39)  HR: 73 (08-16-18 @ 14:25)  BP: 95/63 (08-16-18 @ 14:25)  RR: 18 (08-16-18 @ 14:25)  SpO2: 99% (08-16-18 @ 14:25)  CAPILLARY BLOOD GLUCOSE        I&O's Summary      PHYSICAL EXAM:  GENERAL: NAD, well-developed  HEAD:  Atraumatic, Normocephalic  EYES: EOMI, PERRL, conjunctiva and sclera clear  NECK: Supple, No JVD  CHEST/LUNG: Clear to auscultation bilaterally  HEART: Regular rate and rhythm; No murmurs, rubs, or gallops, No Edema  ABDOMEN: Soft, Nontender, Nondistended; Bowel sounds present  EXTREMITIES:  2+ Peripheral Pulses, No clubbing, cyanosis  PSYCH: Flat affect   NEUROLOGY: non-focal  SKIN: No rashes or lesions    LABS:        Cr 0.83        RADIOLOGY & ADDITIONAL TESTS:    Imaging Personally Reviewed: CTH - no acute pathology     Consultant(s) Notes Reviewed:      Care Discussed with Consultants/Other Providers: Psych - Dr Vaughan

## 2018-08-17 NOTE — CONSULT NOTE ADULT - ASSESSMENT
54 yo female with PMH of depression and anxiety with past psych admissions in [place], brought in by sister for strange behavior, admitted for possible BZD withdrawal vs acute psychosis, now at [facility]

## 2018-08-18 PROCEDURE — 99232 SBSQ HOSP IP/OBS MODERATE 35: CPT

## 2018-08-18 RX ORDER — ONDANSETRON 8 MG/1
4 TABLET, FILM COATED ORAL EVERY 8 HOURS
Qty: 0 | Refills: 0 | Status: DISCONTINUED | OUTPATIENT
Start: 2018-08-18 | End: 2018-08-28

## 2018-08-18 RX ORDER — LANOLIN ALCOHOL/MO/W.PET/CERES
3 CREAM (GRAM) TOPICAL AT BEDTIME
Qty: 0 | Refills: 0 | Status: DISCONTINUED | OUTPATIENT
Start: 2018-08-18 | End: 2018-08-22

## 2018-08-18 RX ORDER — ACETAMINOPHEN 500 MG
650 TABLET ORAL EVERY 6 HOURS
Qty: 0 | Refills: 0 | Status: DISCONTINUED | OUTPATIENT
Start: 2018-08-18 | End: 2018-08-28

## 2018-08-18 RX ADMIN — ESCITALOPRAM OXALATE 10 MILLIGRAM(S): 10 TABLET, FILM COATED ORAL at 09:39

## 2018-08-18 RX ADMIN — Medication 3 MILLIGRAM(S): at 20:57

## 2018-08-18 RX ADMIN — Medication 0.5 MILLIGRAM(S): at 21:31

## 2018-08-18 RX ADMIN — Medication 650 MILLIGRAM(S): at 20:57

## 2018-08-18 RX ADMIN — Medication 0.5 MILLIGRAM(S): at 06:54

## 2018-08-18 RX ADMIN — Medication 0.5 MILLIGRAM(S): at 14:58

## 2018-08-18 RX ADMIN — Medication 650 MILLIGRAM(S): at 12:01

## 2018-08-18 RX ADMIN — OLANZAPINE 2.5 MILLIGRAM(S): 15 TABLET, FILM COATED ORAL at 20:57

## 2018-08-18 RX ADMIN — Medication 50 MILLIGRAM(S): at 20:57

## 2018-08-18 RX ADMIN — ONDANSETRON 4 MILLIGRAM(S): 8 TABLET, FILM COATED ORAL at 12:01

## 2018-08-19 PROCEDURE — 99232 SBSQ HOSP IP/OBS MODERATE 35: CPT

## 2018-08-19 RX ADMIN — OLANZAPINE 2.5 MILLIGRAM(S): 15 TABLET, FILM COATED ORAL at 20:22

## 2018-08-19 RX ADMIN — Medication 0.5 MILLIGRAM(S): at 20:22

## 2018-08-19 RX ADMIN — Medication 0.5 MILLIGRAM(S): at 08:55

## 2018-08-19 RX ADMIN — ESCITALOPRAM OXALATE 10 MILLIGRAM(S): 10 TABLET, FILM COATED ORAL at 08:55

## 2018-08-19 RX ADMIN — Medication 50 MILLIGRAM(S): at 14:35

## 2018-08-19 RX ADMIN — Medication 50 MILLIGRAM(S): at 20:22

## 2018-08-20 PROCEDURE — 99232 SBSQ HOSP IP/OBS MODERATE 35: CPT

## 2018-08-20 RX ORDER — TRAZODONE HCL 50 MG
150 TABLET ORAL AT BEDTIME
Qty: 0 | Refills: 0 | Status: DISCONTINUED | OUTPATIENT
Start: 2018-08-20 | End: 2018-08-21

## 2018-08-20 RX ORDER — TRAZODONE HCL 50 MG
100 TABLET ORAL AT BEDTIME
Qty: 0 | Refills: 0 | Status: DISCONTINUED | OUTPATIENT
Start: 2018-08-20 | End: 2018-08-20

## 2018-08-20 RX ORDER — IBUPROFEN 200 MG
400 TABLET ORAL EVERY 6 HOURS
Qty: 0 | Refills: 0 | Status: DISCONTINUED | OUTPATIENT
Start: 2018-08-20 | End: 2018-08-24

## 2018-08-20 RX ADMIN — Medication 650 MILLIGRAM(S): at 12:38

## 2018-08-20 RX ADMIN — ESCITALOPRAM OXALATE 10 MILLIGRAM(S): 10 TABLET, FILM COATED ORAL at 09:10

## 2018-08-20 RX ADMIN — Medication 0.5 MILLIGRAM(S): at 09:10

## 2018-08-20 RX ADMIN — Medication 150 MILLIGRAM(S): at 21:20

## 2018-08-20 RX ADMIN — OLANZAPINE 2.5 MILLIGRAM(S): 15 TABLET, FILM COATED ORAL at 21:20

## 2018-08-21 PROCEDURE — 99232 SBSQ HOSP IP/OBS MODERATE 35: CPT

## 2018-08-21 PROCEDURE — 90832 PSYTX W PT 30 MINUTES: CPT | Mod: 59

## 2018-08-21 RX ORDER — ESCITALOPRAM OXALATE 10 MG/1
20 TABLET, FILM COATED ORAL DAILY
Qty: 0 | Refills: 0 | Status: DISCONTINUED | OUTPATIENT
Start: 2018-08-22 | End: 2018-08-28

## 2018-08-21 RX ORDER — ESCITALOPRAM OXALATE 10 MG/1
10 TABLET, FILM COATED ORAL ONCE
Qty: 0 | Refills: 0 | Status: COMPLETED | OUTPATIENT
Start: 2018-08-21 | End: 2018-08-21

## 2018-08-21 RX ORDER — TRAZODONE HCL 50 MG
200 TABLET ORAL AT BEDTIME
Qty: 0 | Refills: 0 | Status: DISCONTINUED | OUTPATIENT
Start: 2018-08-21 | End: 2018-08-22

## 2018-08-21 RX ADMIN — Medication 50 MILLIGRAM(S): at 12:11

## 2018-08-21 RX ADMIN — Medication 50 MILLIGRAM(S): at 18:53

## 2018-08-21 RX ADMIN — ESCITALOPRAM OXALATE 10 MILLIGRAM(S): 10 TABLET, FILM COATED ORAL at 08:24

## 2018-08-21 RX ADMIN — ESCITALOPRAM OXALATE 10 MILLIGRAM(S): 10 TABLET, FILM COATED ORAL at 09:50

## 2018-08-21 RX ADMIN — Medication 650 MILLIGRAM(S): at 15:11

## 2018-08-21 RX ADMIN — OLANZAPINE 2.5 MILLIGRAM(S): 15 TABLET, FILM COATED ORAL at 20:54

## 2018-08-22 PROCEDURE — 99232 SBSQ HOSP IP/OBS MODERATE 35: CPT

## 2018-08-22 RX ORDER — LANOLIN ALCOHOL/MO/W.PET/CERES
3 CREAM (GRAM) TOPICAL AT BEDTIME
Qty: 0 | Refills: 0 | Status: DISCONTINUED | OUTPATIENT
Start: 2018-08-22 | End: 2018-08-23

## 2018-08-22 RX ORDER — TRAZODONE HCL 50 MG
100 TABLET ORAL AT BEDTIME
Qty: 0 | Refills: 0 | Status: DISCONTINUED | OUTPATIENT
Start: 2018-08-22 | End: 2018-08-28

## 2018-08-22 RX ORDER — OLANZAPINE 15 MG/1
5 TABLET, FILM COATED ORAL AT BEDTIME
Qty: 0 | Refills: 0 | Status: DISCONTINUED | OUTPATIENT
Start: 2018-08-22 | End: 2018-08-28

## 2018-08-22 RX ADMIN — Medication 50 MILLIGRAM(S): at 17:09

## 2018-08-22 RX ADMIN — ESCITALOPRAM OXALATE 20 MILLIGRAM(S): 10 TABLET, FILM COATED ORAL at 09:11

## 2018-08-22 RX ADMIN — Medication 5 MILLIGRAM(S): at 11:55

## 2018-08-22 RX ADMIN — Medication 650 MILLIGRAM(S): at 11:55

## 2018-08-22 RX ADMIN — Medication 5 MILLIGRAM(S): at 21:01

## 2018-08-22 RX ADMIN — OLANZAPINE 5 MILLIGRAM(S): 15 TABLET, FILM COATED ORAL at 21:01

## 2018-08-22 RX ADMIN — Medication 3 MILLIGRAM(S): at 21:01

## 2018-08-23 PROCEDURE — 90832 PSYTX W PT 30 MINUTES: CPT | Mod: 59

## 2018-08-23 PROCEDURE — 90853 GROUP PSYCHOTHERAPY: CPT

## 2018-08-23 PROCEDURE — 99232 SBSQ HOSP IP/OBS MODERATE 35: CPT | Mod: 25

## 2018-08-23 RX ORDER — LANOLIN ALCOHOL/MO/W.PET/CERES
5 CREAM (GRAM) TOPICAL AT BEDTIME
Qty: 0 | Refills: 0 | Status: DISCONTINUED | OUTPATIENT
Start: 2018-08-23 | End: 2018-08-28

## 2018-08-23 RX ORDER — QUETIAPINE FUMARATE 200 MG/1
25 TABLET, FILM COATED ORAL EVERY 4 HOURS
Qty: 0 | Refills: 0 | Status: DISCONTINUED | OUTPATIENT
Start: 2018-08-23 | End: 2018-08-28

## 2018-08-23 RX ORDER — LANOLIN ALCOHOL/MO/W.PET/CERES
5 CREAM (GRAM) TOPICAL AT BEDTIME
Qty: 0 | Refills: 0 | Status: DISCONTINUED | OUTPATIENT
Start: 2018-08-23 | End: 2018-08-23

## 2018-08-23 RX ADMIN — Medication 5 MILLIGRAM(S): at 14:26

## 2018-08-23 RX ADMIN — Medication 5 MILLIGRAM(S): at 21:14

## 2018-08-23 RX ADMIN — OLANZAPINE 5 MILLIGRAM(S): 15 TABLET, FILM COATED ORAL at 21:14

## 2018-08-23 RX ADMIN — Medication 650 MILLIGRAM(S): at 10:04

## 2018-08-23 RX ADMIN — Medication 5 MILLIGRAM(S): at 10:04

## 2018-08-23 RX ADMIN — ESCITALOPRAM OXALATE 20 MILLIGRAM(S): 10 TABLET, FILM COATED ORAL at 10:04

## 2018-08-23 RX ADMIN — Medication 650 MILLIGRAM(S): at 17:37

## 2018-08-24 PROCEDURE — 99232 SBSQ HOSP IP/OBS MODERATE 35: CPT

## 2018-08-24 RX ORDER — LIDOCAINE 4 G/100G
1 CREAM TOPICAL DAILY
Qty: 0 | Refills: 0 | Status: DISCONTINUED | OUTPATIENT
Start: 2018-08-24 | End: 2018-08-28

## 2018-08-24 RX ORDER — CLONAZEPAM 1 MG
0.5 TABLET ORAL ONCE
Qty: 0 | Refills: 0 | Status: DISCONTINUED | OUTPATIENT
Start: 2018-08-24 | End: 2018-08-24

## 2018-08-24 RX ORDER — FAMOTIDINE 10 MG/ML
20 INJECTION INTRAVENOUS
Qty: 0 | Refills: 0 | Status: DISCONTINUED | OUTPATIENT
Start: 2018-08-24 | End: 2018-08-28

## 2018-08-24 RX ORDER — CLONAZEPAM 1 MG
0.5 TABLET ORAL DAILY
Qty: 0 | Refills: 0 | Status: DISCONTINUED | OUTPATIENT
Start: 2018-08-25 | End: 2018-08-28

## 2018-08-24 RX ADMIN — Medication 0.5 MILLIGRAM(S): at 15:29

## 2018-08-24 RX ADMIN — Medication 5 MILLIGRAM(S): at 21:14

## 2018-08-24 RX ADMIN — QUETIAPINE FUMARATE 25 MILLIGRAM(S): 200 TABLET, FILM COATED ORAL at 09:01

## 2018-08-24 RX ADMIN — FAMOTIDINE 20 MILLIGRAM(S): 10 INJECTION INTRAVENOUS at 21:13

## 2018-08-24 RX ADMIN — Medication 7.5 MILLIGRAM(S): at 12:34

## 2018-08-24 RX ADMIN — Medication 5 MILLIGRAM(S): at 08:59

## 2018-08-24 RX ADMIN — Medication 7.5 MILLIGRAM(S): at 21:13

## 2018-08-24 RX ADMIN — OLANZAPINE 5 MILLIGRAM(S): 15 TABLET, FILM COATED ORAL at 21:14

## 2018-08-24 RX ADMIN — LIDOCAINE 1 PATCH: 4 CREAM TOPICAL at 12:34

## 2018-08-24 RX ADMIN — ESCITALOPRAM OXALATE 20 MILLIGRAM(S): 10 TABLET, FILM COATED ORAL at 08:59

## 2018-08-25 RX ADMIN — LIDOCAINE 1 PATCH: 4 CREAM TOPICAL at 21:19

## 2018-08-25 RX ADMIN — Medication 7.5 MILLIGRAM(S): at 08:17

## 2018-08-25 RX ADMIN — LIDOCAINE 1 PATCH: 4 CREAM TOPICAL at 08:17

## 2018-08-25 RX ADMIN — Medication 7.5 MILLIGRAM(S): at 14:29

## 2018-08-25 RX ADMIN — ESCITALOPRAM OXALATE 20 MILLIGRAM(S): 10 TABLET, FILM COATED ORAL at 08:17

## 2018-08-25 RX ADMIN — FAMOTIDINE 20 MILLIGRAM(S): 10 INJECTION INTRAVENOUS at 08:17

## 2018-08-25 RX ADMIN — Medication 7.5 MILLIGRAM(S): at 21:19

## 2018-08-25 RX ADMIN — Medication 0.5 MILLIGRAM(S): at 08:17

## 2018-08-25 RX ADMIN — OLANZAPINE 5 MILLIGRAM(S): 15 TABLET, FILM COATED ORAL at 21:19

## 2018-08-25 RX ADMIN — FAMOTIDINE 20 MILLIGRAM(S): 10 INJECTION INTRAVENOUS at 21:19

## 2018-08-25 RX ADMIN — Medication 5 MILLIGRAM(S): at 21:19

## 2018-08-25 RX ADMIN — LIDOCAINE 1 PATCH: 4 CREAM TOPICAL at 00:53

## 2018-08-25 RX ADMIN — QUETIAPINE FUMARATE 25 MILLIGRAM(S): 200 TABLET, FILM COATED ORAL at 16:46

## 2018-08-26 RX ADMIN — Medication 5 MILLIGRAM(S): at 20:50

## 2018-08-26 RX ADMIN — QUETIAPINE FUMARATE 25 MILLIGRAM(S): 200 TABLET, FILM COATED ORAL at 17:14

## 2018-08-26 RX ADMIN — OLANZAPINE 5 MILLIGRAM(S): 15 TABLET, FILM COATED ORAL at 20:51

## 2018-08-26 RX ADMIN — ESCITALOPRAM OXALATE 20 MILLIGRAM(S): 10 TABLET, FILM COATED ORAL at 08:40

## 2018-08-26 RX ADMIN — Medication 7.5 MILLIGRAM(S): at 20:51

## 2018-08-26 RX ADMIN — Medication 7.5 MILLIGRAM(S): at 08:40

## 2018-08-26 RX ADMIN — Medication 0.5 MILLIGRAM(S): at 08:40

## 2018-08-26 RX ADMIN — Medication 7.5 MILLIGRAM(S): at 13:07

## 2018-08-26 RX ADMIN — FAMOTIDINE 20 MILLIGRAM(S): 10 INJECTION INTRAVENOUS at 08:40

## 2018-08-26 RX ADMIN — LIDOCAINE 1 PATCH: 4 CREAM TOPICAL at 09:03

## 2018-08-26 RX ADMIN — LIDOCAINE 1 PATCH: 4 CREAM TOPICAL at 20:50

## 2018-08-26 RX ADMIN — FAMOTIDINE 20 MILLIGRAM(S): 10 INJECTION INTRAVENOUS at 20:51

## 2018-08-26 RX ADMIN — QUETIAPINE FUMARATE 25 MILLIGRAM(S): 200 TABLET, FILM COATED ORAL at 13:14

## 2018-08-27 PROCEDURE — 90853 GROUP PSYCHOTHERAPY: CPT

## 2018-08-27 PROCEDURE — 99232 SBSQ HOSP IP/OBS MODERATE 35: CPT | Mod: 25

## 2018-08-27 PROCEDURE — 90832 PSYTX W PT 30 MINUTES: CPT | Mod: 59

## 2018-08-27 RX ORDER — LIDOCAINE 4 G/100G
1 CREAM TOPICAL
Qty: 15 | Refills: 0 | OUTPATIENT
Start: 2018-08-27

## 2018-08-27 RX ORDER — QUETIAPINE FUMARATE 200 MG/1
1 TABLET, FILM COATED ORAL
Qty: 60 | Refills: 0 | OUTPATIENT
Start: 2018-08-27

## 2018-08-27 RX ORDER — LIDOCAINE 4 G/100G
1 CREAM TOPICAL
Qty: 0 | Refills: 0 | COMMUNITY
Start: 2018-08-27

## 2018-08-27 RX ORDER — CLONAZEPAM 1 MG
1 TABLET ORAL
Qty: 15 | Refills: 0 | OUTPATIENT
Start: 2018-08-27

## 2018-08-27 RX ORDER — ESCITALOPRAM OXALATE 10 MG/1
1 TABLET, FILM COATED ORAL
Qty: 0 | Refills: 0 | COMMUNITY
Start: 2018-08-27

## 2018-08-27 RX ORDER — ESCITALOPRAM OXALATE 10 MG/1
1 TABLET, FILM COATED ORAL
Qty: 30 | Refills: 0 | OUTPATIENT
Start: 2018-08-27

## 2018-08-27 RX ORDER — FAMOTIDINE 10 MG/ML
1 INJECTION INTRAVENOUS
Qty: 0 | Refills: 0 | COMMUNITY
Start: 2018-08-27

## 2018-08-27 RX ORDER — OLANZAPINE 15 MG/1
1 TABLET, FILM COATED ORAL
Qty: 30 | Refills: 0 | OUTPATIENT
Start: 2018-08-27

## 2018-08-27 RX ORDER — OLANZAPINE 15 MG/1
1 TABLET, FILM COATED ORAL
Qty: 0 | Refills: 0 | COMMUNITY
Start: 2018-08-27

## 2018-08-27 RX ORDER — QUETIAPINE FUMARATE 200 MG/1
1 TABLET, FILM COATED ORAL
Qty: 0 | Refills: 0 | COMMUNITY
Start: 2018-08-27

## 2018-08-27 RX ORDER — LANOLIN ALCOHOL/MO/W.PET/CERES
1 CREAM (GRAM) TOPICAL
Qty: 30 | Refills: 0 | OUTPATIENT
Start: 2018-08-27

## 2018-08-27 RX ORDER — CLONAZEPAM 1 MG
1 TABLET ORAL
Qty: 0 | Refills: 0 | COMMUNITY
Start: 2018-08-27

## 2018-08-27 RX ORDER — FAMOTIDINE 10 MG/ML
1 INJECTION INTRAVENOUS
Qty: 60 | Refills: 0 | OUTPATIENT
Start: 2018-08-27

## 2018-08-27 RX ADMIN — OLANZAPINE 5 MILLIGRAM(S): 15 TABLET, FILM COATED ORAL at 21:19

## 2018-08-27 RX ADMIN — Medication 7.5 MILLIGRAM(S): at 13:13

## 2018-08-27 RX ADMIN — FAMOTIDINE 20 MILLIGRAM(S): 10 INJECTION INTRAVENOUS at 08:32

## 2018-08-27 RX ADMIN — ESCITALOPRAM OXALATE 20 MILLIGRAM(S): 10 TABLET, FILM COATED ORAL at 08:32

## 2018-08-27 RX ADMIN — Medication 7.5 MILLIGRAM(S): at 21:19

## 2018-08-27 RX ADMIN — LIDOCAINE 1 PATCH: 4 CREAM TOPICAL at 09:51

## 2018-08-27 RX ADMIN — FAMOTIDINE 20 MILLIGRAM(S): 10 INJECTION INTRAVENOUS at 21:19

## 2018-08-27 RX ADMIN — Medication 7.5 MILLIGRAM(S): at 08:32

## 2018-08-27 RX ADMIN — LIDOCAINE 1 PATCH: 4 CREAM TOPICAL at 21:19

## 2018-08-27 RX ADMIN — Medication 0.5 MILLIGRAM(S): at 08:32

## 2018-08-27 RX ADMIN — Medication 5 MILLIGRAM(S): at 21:19

## 2018-08-28 VITALS — TEMPERATURE: 98 F

## 2018-08-28 PROCEDURE — 99238 HOSP IP/OBS DSCHRG MGMT 30/<: CPT

## 2018-08-28 RX ORDER — CLONAZEPAM 1 MG
0.5 TABLET ORAL ONCE
Qty: 0 | Refills: 0 | Status: DISCONTINUED | OUTPATIENT
Start: 2018-08-28 | End: 2018-08-28

## 2018-08-28 RX ADMIN — Medication 0.5 MILLIGRAM(S): at 13:21

## 2018-08-28 RX ADMIN — FAMOTIDINE 20 MILLIGRAM(S): 10 INJECTION INTRAVENOUS at 09:09

## 2018-08-28 RX ADMIN — Medication 7.5 MILLIGRAM(S): at 13:09

## 2018-08-28 RX ADMIN — Medication 0.5 MILLIGRAM(S): at 09:09

## 2018-08-28 RX ADMIN — Medication 7.5 MILLIGRAM(S): at 09:09

## 2018-08-28 RX ADMIN — LIDOCAINE 1 PATCH: 4 CREAM TOPICAL at 09:09

## 2018-08-28 RX ADMIN — Medication 650 MILLIGRAM(S): at 13:09

## 2018-08-28 RX ADMIN — ESCITALOPRAM OXALATE 20 MILLIGRAM(S): 10 TABLET, FILM COATED ORAL at 09:09

## 2020-10-28 NOTE — H&P ADULT - PROBLEM SELECTOR PROBLEM 4
Depression, unspecified depression type Home Suture Removal Text: Patient was provided instructions on removing sutures and will remove their sutures at home.  If they have any questions or difficulties they will call the office.

## 2021-08-18 NOTE — PATIENT PROFILE ADULT. - AS SC BRADEN SENSORY
Impression: Other long term (current) drug therapy: Z79.899. Plan: monitor, 10-2 is wnl OU, needs 6 month check w/ oct mac and dfe OU.
(4) no impairment

## 2023-11-07 NOTE — H&P ADULT - PROBLEM SELECTOR PLAN 1
Patient off xanax since Friday, was on 1mg TID. Will start her on 0.25mg TID. Assess for any BZD withdrawal. Vitals currently stable. English